# Patient Record
Sex: MALE | Race: BLACK OR AFRICAN AMERICAN | Employment: OTHER | URBAN - METROPOLITAN AREA
[De-identification: names, ages, dates, MRNs, and addresses within clinical notes are randomized per-mention and may not be internally consistent; named-entity substitution may affect disease eponyms.]

---

## 2019-07-29 ENCOUNTER — HOSPITAL ENCOUNTER (EMERGENCY)
Age: 49
Discharge: HOME | End: 2019-07-29
Payer: MEDICAID

## 2019-07-29 VITALS
WEIGHT: 181 LBS | TEMPERATURE: 98 F | HEART RATE: 73 BPM | OXYGEN SATURATION: 98 % | RESPIRATION RATE: 18 BRPM | DIASTOLIC BLOOD PRESSURE: 98 MMHG | BODY MASS INDEX: 25.91 KG/M2 | HEIGHT: 70 IN | SYSTOLIC BLOOD PRESSURE: 168 MMHG

## 2019-07-29 DIAGNOSIS — L30.9 ECZEMA, UNSPECIFIED TYPE: Primary | ICD-10-CM

## 2019-07-29 PROCEDURE — 6370000000 HC RX 637 (ALT 250 FOR IP): Performed by: PHYSICIAN ASSISTANT

## 2019-07-29 PROCEDURE — 99282 EMERGENCY DEPT VISIT SF MDM: CPT

## 2019-07-29 RX ORDER — PREDNISONE 20 MG/1
60 TABLET ORAL ONCE
Status: COMPLETED | OUTPATIENT
Start: 2019-07-29 | End: 2019-07-29

## 2019-07-29 RX ORDER — PREDNISONE 10 MG/1
TABLET ORAL
Qty: 61 TABLET | Refills: 0 | Status: SHIPPED | OUTPATIENT
Start: 2019-07-29 | End: 2019-08-20

## 2019-07-29 RX ADMIN — PREDNISONE 60 MG: 20 TABLET ORAL at 20:54

## 2019-08-02 NOTE — ED PROVIDER NOTES
on file     Attends meetings of clubs or organizations: Not on file     Relationship status: Not on file    Intimate partner violence:     Fear of current or ex partner: Not on file     Emotionally abused: Not on file     Physically abused: Not on file     Forced sexual activity: Not on file   Other Topics Concern    Not on file   Social History Narrative    Not on file       SCREENINGS             PHYSICAL EXAM    (up to 7 for level 4, 8 or more for level 5)     ED Triage Vitals   BP Temp Temp Source Pulse Resp SpO2 Height Weight   07/29/19 2027 07/29/19 2024 07/29/19 2024 07/29/19 2024 07/29/19 2024 07/29/19 2024 07/29/19 2024 07/29/19 2024   (!) 188/123 98 °F (36.7 °C) Oral 73 18 98 % 5' 10\" (1.778 m) 181 lb (82.1 kg)       Physical Exam   Constitutional: He is oriented to person, place, and time. He appears well-developed and well-nourished. He is active. No distress. HENT:   Head: Normocephalic and atraumatic. Mouth/Throat: Mucous membranes are normal.   Eyes: Conjunctivae and lids are normal.   Neck: Normal range of motion. Neck supple. Cardiovascular: Normal rate, regular rhythm, normal heart sounds, intact distal pulses and normal pulses. Pulmonary/Chest: Effort normal and breath sounds normal.   Abdominal: Soft. Normal appearance and bowel sounds are normal. There is no tenderness. Lymphadenopathy:     He has no cervical adenopathy. Neurological: He is alert and oriented to person, place, and time. He has normal strength. Skin: Skin is warm, dry and intact. Capillary refill takes less than 2 seconds. Rash noted. Rash is maculopapular. He is not diaphoretic. Skin colored raised rash, dry skin. Psychiatric: Judgment and thought content normal.   Nursing note and vitals reviewed. All other labs were within normal range or not returned as of this dictation.     EMERGENCY DEPARTMENT COURSE and DIFFERENTIALDIAGNOSIS/MDM:   Vitals:    Vitals:    07/29/19 2024 07/29/19 2027 07/29/19

## 2022-01-24 ENCOUNTER — HOSPITAL ENCOUNTER (EMERGENCY)
Age: 52
Discharge: HOME | End: 2022-01-24
Payer: MEDICAID

## 2022-01-24 ENCOUNTER — OFFICE VISIT (OUTPATIENT)
Dept: SURGERY | Age: 52
End: 2022-01-24
Payer: MEDICAID

## 2022-01-24 VITALS
HEART RATE: 69 BPM | DIASTOLIC BLOOD PRESSURE: 110 MMHG | OXYGEN SATURATION: 97 % | WEIGHT: 181 LBS | HEIGHT: 69 IN | TEMPERATURE: 97.7 F | SYSTOLIC BLOOD PRESSURE: 172 MMHG | BODY MASS INDEX: 26.81 KG/M2 | RESPIRATION RATE: 20 BRPM

## 2022-01-24 VITALS
WEIGHT: 162 LBS | TEMPERATURE: 98.4 F | BODY MASS INDEX: 23.99 KG/M2 | DIASTOLIC BLOOD PRESSURE: 120 MMHG | HEIGHT: 69 IN | SYSTOLIC BLOOD PRESSURE: 160 MMHG

## 2022-01-24 DIAGNOSIS — L02.91 ABSCESS: Primary | ICD-10-CM

## 2022-01-24 DIAGNOSIS — L02.01 FACIAL ABSCESS: ICD-10-CM

## 2022-01-24 DIAGNOSIS — L02.01 FACIAL ABSCESS: Primary | ICD-10-CM

## 2022-01-24 PROCEDURE — 99202 OFFICE O/P NEW SF 15 MIN: CPT | Performed by: SURGERY

## 2022-01-24 PROCEDURE — G8484 FLU IMMUNIZE NO ADMIN: HCPCS | Performed by: SURGERY

## 2022-01-24 PROCEDURE — 10061 I&D ABSCESS COMP/MULTIPLE: CPT | Performed by: SURGERY

## 2022-01-24 PROCEDURE — G8427 DOCREV CUR MEDS BY ELIG CLIN: HCPCS | Performed by: SURGERY

## 2022-01-24 PROCEDURE — G8420 CALC BMI NORM PARAMETERS: HCPCS | Performed by: SURGERY

## 2022-01-24 PROCEDURE — 99283 EMERGENCY DEPT VISIT LOW MDM: CPT

## 2022-01-24 PROCEDURE — 4004F PT TOBACCO SCREEN RCVD TLK: CPT | Performed by: SURGERY

## 2022-01-24 RX ORDER — SULFAMETHOXAZOLE AND TRIMETHOPRIM 800; 160 MG/1; MG/1
1 TABLET ORAL 2 TIMES DAILY
Qty: 20 TABLET | Refills: 0 | Status: SHIPPED | OUTPATIENT
Start: 2022-01-24 | End: 2022-02-03

## 2022-01-24 ASSESSMENT — ENCOUNTER SYMPTOMS
ABDOMINAL PAIN: 0
SHORTNESS OF BREATH: 0
NAUSEA: 0
BACK PAIN: 0
COUGH: 0

## 2022-01-24 ASSESSMENT — PAIN SCALES - GENERAL: PAINLEVEL_OUTOF10: 3

## 2022-01-24 NOTE — ED TRIAGE NOTES
Pt  Noticed swelling under his left eye about four days ago. He believes he  May have been bitten by  An insect but is not sure. Pt states that yesterday his niece bumped it and it opened up and  Started draining but  Today it is swollen again.

## 2022-01-24 NOTE — ED PROVIDER NOTES
[facility] ED  eMERGENCY dEPARTMENT eNCOUnter      Pt Name: Hardik Castaneda  MRN: [id number]  [place] 1970  Date of evaluation: 1/24/2022  Provider: LUIGI Khan CNP      HISTORY OF PRESENT ILLNESS    Hardik Castaneda is a 54 y.o. male who presents to the Emergency Department with L cheek abscess that he noticed about 4 days ago. Patient states it was bumped and started draining a few days ago. Pain is mild. Denies nausea or fever. REVIEW OF SYSTEMS       Review of Systems   Constitutional: Negative for fever. HENT: Negative for congestion. Respiratory: Negative for cough and shortness of breath. Cardiovascular: Negative for chest pain. Gastrointestinal: Negative for abdominal pain and nausea. Genitourinary: Negative for dysuria. Musculoskeletal: Negative for arthralgias and back pain. Skin: Positive for wound. Negative for rash. All other systems reviewed and are negative. PAST MEDICAL HISTORY     Past Medical History:   Diagnosis Date    Asthma     Hypertension     MI (myocardial infarction) ([facility] [address]          SURGICAL HISTORY       Past Surgical History:   Procedure Laterality Date    CORONARY ANGIOPLASTY WITH STENT PLACEMENT      MOUTH SURGERY           CURRENT MEDICATIONS       Previous Medications    No medications on file       ALLERGIES     Patient has no known allergies. FAMILY HISTORY     History reviewed. No pertinent family history.        SOCIAL HISTORY       Social History     Socioeconomic History    Marital status: Single     Spouse name: None    Number of children: None    Years of education: None    Highest education level: None   Occupational History    None   Tobacco Use    Smoking status: Current Every Day Smoker    Smokeless tobacco: Never Used   Substance and Sexual Activity    Alcohol use: Not Currently    Drug use: Never    Sexual activity: None   Other Topics Concern    None   Social History Narrative    None Social Determinants of Health     Financial Resource Strain:     Difficulty of Paying Living Expenses: Not on file   Food Insecurity:     Worried About Running Out of Food in the Last Year: Not on file    Nhan of Food in the Last Year: Not on file   Transportation Needs:     Lack of Transportation (Medical): Not on file    Lack of Transportation (Non-Medical): Not on file   Physical Activity:     Days of Exercise per Week: Not on file    Minutes of Exercise per Session: Not on file   Stress:     Feeling of Stress : Not on file   Social Connections:     Frequency of Communication with Friends and Family: Not on file    Frequency of Social Gatherings with Friends and Family: Not on file    Attends Mosque Services: Not on file    Active Member of [address] [place] [address] [employer] or Organizations: Not on file    Attends Club or Organization Meetings: Not on file    Marital Status: Not on file   Intimate Partner Violence:     Fear of Current or Ex-Partner: Not on file    Emotionally Abused: Not on file    Physically Abused: Not on file    Sexually Abused: Not on file   Housing Stability:     Unable to Pay for Housing in the Last Year: Not on file    Number of [place] in the Last Year: Not on file    Unstable Housing in the Last Year: Not on file       SCREENINGS      @FLOW([id number])@      PHYSICAL EXAM    (up to 7 for level 4, 8 or more for level 5)     ED Triage Vitals [01/24/22 0853]   BP Temp Temp Source Pulse Resp SpO2 Height Weight   (S) (!) 172/110 97.7 °F (36.5 °C) Oral 69 20 97 % 5' 9\" (1.753 m) 181 lb (82.1 kg)       Physical Exam  Vitals and nursing note reviewed. Constitutional:       Appearance: He is well-developed. HENT:      Head: Normocephalic and atraumatic. Right Ear: Hearing and external ear normal.      Left Ear: Hearing and external ear normal.      Nose: Nose normal.      Mouth/Throat:      Lips: Pink.       Mouth: Mucous membranes are moist.   Eyes:      Conjunctiva/sclera: Conjunctivae normal.      Pupils: Pupils are equal, round, and reactive to light. Cardiovascular:      Rate and Rhythm: Normal rate and regular rhythm. Pulmonary:      Effort: Pulmonary effort is normal.      Breath sounds: Normal breath sounds. Abdominal:      General: Bowel sounds are normal. There is no distension. Palpations: Abdomen is soft. Tenderness: There is no abdominal tenderness. Musculoskeletal:         General: Normal range of motion. Cervical back: Normal range of motion and neck supple. Skin:     General: Skin is warm and dry. Neurological:      Mental Status: He is alert and oriented to person, place, and time. Deep Tendon Reflexes: Reflexes are normal and symmetric. Psychiatric:         Judgment: Judgment normal.           All other labs were within normal range or not returned as of this dictation. EMERGENCY DEPARTMENT COURSE and DIFFERENTIALDIAGNOSIS/MDM:   Vitals:    Vitals:    01/24/22 0853   BP: (S) (!) 172/110   Pulse: 69   Resp: 20   Temp: 97.7 °F (36.5 °C)   TempSrc: Oral   SpO2: 97%   Weight: 181 lb (82.1 kg)   Height: 5' 9\" (1.753 m)            54 yr old male with L cheek abscess. Patient was given a prescription for Bactrim DS. F/U with the surgeon in 1-2 days. Patient verbalizes understanding. PROCEDURES:  Unless otherwise noted below, none     Procedures      FINAL IMPRESSION      1.  Abscess          DISPOSITION/PLAN   DISPOSITION Decision To Discharge 01/24/2022 09:31:03 AM          LUIGI Maldonado CNP (electronically signed)  Attending Emergency Physician     LUIGI Maldonado CNP  01/24/22 8301

## 2022-01-24 NOTE — PROGRESS NOTES
Eva Breen (:  1970) is a 54 y.o. male,New patient, here for evaluation of the following chief complaint(s):  Follow-Up from Hospital (HFU abscess under left eye)         ASSESSMENT/PLAN:  Facial abscess        Incision and drainage  Time out was called and the patient and procedure were identified. Options of therapy were discussed with the patient and Eva Breen agrees to an incision and drainage. The procedure as well as risks and complications including but not exclusive to blood loss and recurrent infection, even requiring further surgery were discussed and a consent was signed. The patient was in the supine position. The area was prepped and draped with betadine in a sterile fashion. The area was infiltrated with 2% lidocaine without epinepherine. A cruciate incision was made with a 15 knife blade and a moderate amount of  purulent material was drained. A culture was obtained. The wound was packed with 1/4 in.nuguaze   DSD was applied. Patient tolerated procedure well with minimal blood loss. Pain level pre procedure was 6 and post procedure it was 2 Instructions were given to remove the packing in 1 days. The wound will be cleansed daily with water and a dressing applied. Return visit in 3 days. .       Subjective   SUBJECTIVE/OBJECTIVE:  HPI  Eva Breen is a 54 y.o. male who is referred to me by [facility] ER for a facial abscess. Patient first noted symptoms 4 day(s)ago. He developed a tender lump. Patient also notes pain. He was seen at  the emergency department. Incision & drainage has not been done. Cultures has not been done. The patient is not on anticoagulants. Patient is on BACTRIM ds for the infection. Review of Systems       Objective   Physical Exam  Constitutional:       General: He is not in acute distress. Appearance: Normal appearance. HENT:      Head:        Mouth/Throat:      Mouth: Mucous membranes are moist.      Pharynx: Oropharynx is clear.    Eyes: Pupils: Pupils are equal, round, and reactive to light. Neck:      Comments: Neck is supple without any masses, no thyromegaly, trachea midline  Musculoskeletal:      Comments: Normal gait   Skin:     Findings: No bruising, lesion or rash. Neurological:      Mental Status: He is alert and oriented to person, place, and time. Psychiatric:         Mood and Affect: Mood normal.         Judgment: Judgment normal.         BP (!) 190/122   Temp 98.4 °F (36.9 °C)   Ht 5' 9\" (1.753 m)   Wt 162 lb (73.5 kg)   BMI 23.92 kg/m²           An electronic signature was used to authenticate this note.     --Francois Esparza MD

## 2022-01-26 LAB
ANAEROBIC CULTURE: ABNORMAL
GRAM STAIN RESULT: ABNORMAL
ORGANISM: ABNORMAL
WOUND/ABSCESS: ABNORMAL

## 2022-01-27 ENCOUNTER — OFFICE VISIT (OUTPATIENT)
Dept: SURGERY | Age: 52
End: 2022-01-27

## 2022-01-27 VITALS
SYSTOLIC BLOOD PRESSURE: 140 MMHG | HEIGHT: 69 IN | TEMPERATURE: 97.3 F | DIASTOLIC BLOOD PRESSURE: 118 MMHG | BODY MASS INDEX: 24.29 KG/M2 | WEIGHT: 164 LBS

## 2022-01-27 DIAGNOSIS — L02.01 FACIAL ABSCESS: Primary | ICD-10-CM

## 2022-01-27 PROCEDURE — 99024 POSTOP FOLLOW-UP VISIT: CPT | Performed by: SURGERY

## 2022-01-27 NOTE — PROGRESS NOTES
Abigail Roberts (:  1970) is a 54 y.o. male,Established patient, here for evaluation of the following chief complaint(s):  Follow Up After Procedure (recheck eye abscess)         ASSESSMENT/PLAN:    Doing well      The patient is instructed to return to see me in 2 weeks. New Bactrim    Subjective   SUBJECTIVE/OBJECTIVE:  HPI  Abigail Roberts is status post Incision and drainage of a facial abscess on 2022. Cultures showed MSSA. The packing has been removed. The wound is not draining. Pain is rated as a 1. He is back to normal activities. Review of Systems       Objective   Physical Exam  Constitutional:       General: He is not in acute distress. Appearance: Normal appearance. HENT:      Head:        Mouth/Throat:      Mouth: Mucous membranes are moist.      Pharynx: Oropharynx is clear. Eyes:      Pupils: Pupils are equal, round, and reactive to light. Neck:      Comments: Neck is supple without any masses, no thyromegaly, trachea midline  Musculoskeletal:      Comments: Normal gait   Skin:     Findings: No bruising, lesion or rash. Neurological:      Mental Status: He is alert and oriented to person, place, and time. Psychiatric:         Mood and Affect: Mood normal.         Judgment: Judgment normal.         BP (!) 140/118   Temp 97.3 °F (36.3 °C)   Ht 5' 9\" (1.753 m)   Wt 164 lb (74.4 kg)   BMI 24.22 kg/m²           An electronic signature was used to authenticate this note.     --Junior Garcia MD

## 2022-02-10 ENCOUNTER — OFFICE VISIT (OUTPATIENT)
Dept: SURGERY | Age: 52
End: 2022-02-10
Payer: MEDICAID

## 2022-02-10 VITALS
DIASTOLIC BLOOD PRESSURE: 86 MMHG | BODY MASS INDEX: 23.85 KG/M2 | TEMPERATURE: 97.7 F | SYSTOLIC BLOOD PRESSURE: 130 MMHG | HEIGHT: 69 IN | WEIGHT: 161 LBS

## 2022-02-10 DIAGNOSIS — L02.01 FACIAL ABSCESS: Primary | ICD-10-CM

## 2022-02-10 PROCEDURE — G8427 DOCREV CUR MEDS BY ELIG CLIN: HCPCS | Performed by: SURGERY

## 2022-02-10 PROCEDURE — G8420 CALC BMI NORM PARAMETERS: HCPCS | Performed by: SURGERY

## 2022-02-10 PROCEDURE — 4004F PT TOBACCO SCREEN RCVD TLK: CPT | Performed by: SURGERY

## 2022-02-10 PROCEDURE — G8484 FLU IMMUNIZE NO ADMIN: HCPCS | Performed by: SURGERY

## 2022-02-10 PROCEDURE — 99212 OFFICE O/P EST SF 10 MIN: CPT | Performed by: SURGERY

## 2022-02-10 NOTE — PROGRESS NOTES
Jg Best (:  1970) is a 54 y.o. male,Established patient, here for evaluation of the following chief complaint(s): Other (follow up recheck under left eye abscess)         ASSESSMENT/PLAN:    Doing well      The patient is instructed to return to see me in 4 weeks. Subjective   SUBJECTIVE/OBJECTIVE:  HPI  Jg Best is status post Incision and drainage of a facial abscess on 2022. Cultures showed MSSA. The packing has been removed. The wound is not draining. Pain is rated as a 1. He is back to normal activities. He is still on Bactrim. Review of Systems       Objective   Physical Exam  Constitutional:       General: He is not in acute distress. Appearance: Normal appearance. HENT:      Head:        Mouth/Throat:      Mouth: Mucous membranes are moist.      Pharynx: Oropharynx is clear. Eyes:      Pupils: Pupils are equal, round, and reactive to light. Neck:      Comments: Neck is supple without any masses, no thyromegaly, trachea midline  Musculoskeletal:      Right lower leg: No edema. Comments: Normal gait   Skin:     Findings: No bruising, lesion or rash. Neurological:      Mental Status: He is alert and oriented to person, place, and time. Psychiatric:         Mood and Affect: Mood normal.         Judgment: Judgment normal.         /86   Temp 97.7 °F (36.5 °C)   Ht 5' 9\" (1.753 m)   Wt 161 lb (73 kg)   BMI 23.78 kg/m²           An electronic signature was used to authenticate this note.     --Latasha Murdock MD

## 2022-03-10 ENCOUNTER — OFFICE VISIT (OUTPATIENT)
Dept: SURGERY | Age: 52
End: 2022-03-10
Payer: MEDICAID

## 2022-03-10 VITALS
HEIGHT: 69 IN | BODY MASS INDEX: 23.85 KG/M2 | WEIGHT: 161 LBS | DIASTOLIC BLOOD PRESSURE: 78 MMHG | SYSTOLIC BLOOD PRESSURE: 122 MMHG | TEMPERATURE: 96.9 F

## 2022-03-10 DIAGNOSIS — L02.01 FACIAL ABSCESS: Primary | ICD-10-CM

## 2022-03-10 PROCEDURE — 4004F PT TOBACCO SCREEN RCVD TLK: CPT | Performed by: SURGERY

## 2022-03-10 PROCEDURE — G8484 FLU IMMUNIZE NO ADMIN: HCPCS | Performed by: SURGERY

## 2022-03-10 PROCEDURE — G8427 DOCREV CUR MEDS BY ELIG CLIN: HCPCS | Performed by: SURGERY

## 2022-03-10 PROCEDURE — 99212 OFFICE O/P EST SF 10 MIN: CPT | Performed by: SURGERY

## 2022-03-10 PROCEDURE — G8420 CALC BMI NORM PARAMETERS: HCPCS | Performed by: SURGERY

## 2022-03-10 NOTE — PROGRESS NOTES
Jay Collins (:  1970) is a 54 y.o. male,Established patient, here for evaluation of the following chief complaint(s): Other (recheck abscess under eye)         ASSESSMENT/PLAN:    Doing well      The patient is instructed to return to see me as needed    Subjective   SUBJECTIVE/OBJECTIVE:  Other      Jay Collins is status post Incision and drainage of a facial abscess on 2022. Cultures showed MSSA. The packing has been removed. The wound is not draining. Pain is rated as a 0. He is back to normal activities. He is on no antibiotics. He is very pleased with his results  Review of Systems   14 systems reviewed and negative other than history of present illness    Objective   Physical Exam  Constitutional:       General: He is not in acute distress. Appearance: Normal appearance. HENT:      Head:        Mouth/Throat:      Mouth: Mucous membranes are moist.      Pharynx: Oropharynx is clear. Eyes:      Pupils: Pupils are equal, round, and reactive to light. Neck:      Comments: Neck is supple without any masses, no thyromegaly, trachea midline  Musculoskeletal:      Right lower leg: No edema. Comments: Normal gait   Skin:     Findings: No bruising, lesion or rash. Neurological:      Mental Status: He is alert and oriented to person, place, and time. Psychiatric:         Mood and Affect: Mood normal.         Judgment: Judgment normal.         /78   Temp 96.9 °F (36.1 °C)   Ht 5' 9\" (1.753 m)   Wt 161 lb (73 kg)   BMI 23.78 kg/m²           An electronic signature was used to authenticate this note.     --Maria M Reed MD

## 2022-04-19 ENCOUNTER — HOSPITAL ENCOUNTER (INPATIENT)
Age: 52
LOS: 2 days | Discharge: HOME | DRG: 174 | End: 2022-04-21
Attending: STUDENT IN AN ORGANIZED HEALTH CARE EDUCATION/TRAINING PROGRAM | Admitting: INTERNAL MEDICINE
Payer: MEDICAID

## 2022-04-19 ENCOUNTER — APPOINTMENT (OUTPATIENT)
Dept: GENERAL RADIOLOGY | Age: 52
DRG: 174 | End: 2022-04-19
Payer: MEDICAID

## 2022-04-19 DIAGNOSIS — I21.3 ST ELEVATION MYOCARDIAL INFARCTION (STEMI), UNSPECIFIED ARTERY (HCC): Primary | ICD-10-CM

## 2022-04-19 DIAGNOSIS — I25.10 CAD S/P PERCUTANEOUS CORONARY ANGIOPLASTY: ICD-10-CM

## 2022-04-19 DIAGNOSIS — Z98.61 CAD S/P PERCUTANEOUS CORONARY ANGIOPLASTY: ICD-10-CM

## 2022-04-19 LAB
ABO/RH: NORMAL
ALBUMIN SERPL-MCNC: 4.2 G/DL (ref 3.5–4.6)
ALP BLD-CCNC: 107 U/L (ref 35–104)
ALT SERPL-CCNC: 18 U/L (ref 0–41)
ANION GAP SERPL CALCULATED.3IONS-SCNC: 10 MEQ/L (ref 9–15)
ANTIBODY SCREEN: NORMAL
APTT: 29.4 SEC (ref 24.4–36.8)
AST SERPL-CCNC: 29 U/L (ref 0–40)
BASOPHILS ABSOLUTE: 0.1 K/UL (ref 0–0.2)
BASOPHILS RELATIVE PERCENT: 1 %
BILIRUB SERPL-MCNC: 0.5 MG/DL (ref 0.2–0.7)
BUN BLDV-MCNC: 9 MG/DL (ref 6–20)
CALCIUM SERPL-MCNC: 9.5 MG/DL (ref 8.5–9.9)
CHLORIDE BLD-SCNC: 101 MEQ/L (ref 95–107)
CHP ED QC CHECK: YES
CO2: 26 MEQ/L (ref 20–31)
CREAT SERPL-MCNC: 1.02 MG/DL (ref 0.7–1.2)
EOSINOPHILS ABSOLUTE: 0.4 K/UL (ref 0–0.7)
EOSINOPHILS RELATIVE PERCENT: 5.3 %
ETHANOL PERCENT: NORMAL G/DL
ETHANOL: <10 MG/DL (ref 0–0.08)
GFR AFRICAN AMERICAN: >60
GFR NON-AFRICAN AMERICAN: >60
GLOBULIN: 3.1 G/DL (ref 2.3–3.5)
GLUCOSE BLD-MCNC: 120 MG/DL
GLUCOSE BLD-MCNC: 120 MG/DL (ref 70–99)
GLUCOSE BLD-MCNC: 132 MG/DL (ref 70–99)
HCT VFR BLD CALC: 41.1 % (ref 42–52)
HEMOGLOBIN: 13.8 G/DL (ref 14–18)
INR BLD: 1
LACTIC ACID: 0.9 MMOL/L (ref 0.5–2.2)
LV EF: 40 %
LVEF MODALITY: NORMAL
LYMPHOCYTES ABSOLUTE: 1.8 K/UL (ref 1–4.8)
LYMPHOCYTES RELATIVE PERCENT: 24.7 %
MAGNESIUM: 2 MG/DL (ref 1.7–2.4)
MCH RBC QN AUTO: 31.4 PG (ref 27–31.3)
MCHC RBC AUTO-ENTMCNC: 33.7 % (ref 33–37)
MCV RBC AUTO: 93.2 FL (ref 80–100)
MONOCYTES ABSOLUTE: 0.8 K/UL (ref 0.2–0.8)
MONOCYTES RELATIVE PERCENT: 10.7 %
NEUTROPHILS ABSOLUTE: 4.4 K/UL (ref 1.4–6.5)
NEUTROPHILS RELATIVE PERCENT: 58.3 %
PDW BLD-RTO: 14.8 % (ref 11.5–14.5)
PERFORMED ON: ABNORMAL
PERFORMED ON: ABNORMAL
PLATELET # BLD: 219 K/UL (ref 130–400)
POC ACTIVATED CLOTTING TIME KAOLIN: 368 SEC (ref 82–152)
POC SAMPLE TYPE: ABNORMAL
POTASSIUM SERPL-SCNC: 3.4 MEQ/L (ref 3.4–4.9)
PRO-BNP: 3503 PG/ML
PROTHROMBIN TIME: 13.1 SEC (ref 12.3–14.9)
RBC # BLD: 4.41 M/UL (ref 4.7–6.1)
SODIUM BLD-SCNC: 137 MEQ/L (ref 135–144)
TOTAL CK: 291 U/L (ref 0–190)
TOTAL PROTEIN: 7.3 G/DL (ref 6.3–8)
TROPONIN: 0.43 NG/ML (ref 0–0.01)
TSH REFLEX: 0.91 UIU/ML (ref 0.44–3.86)
WBC # BLD: 7.5 K/UL (ref 4.8–10.8)

## 2022-04-19 PROCEDURE — 96375 TX/PRO/DX INJ NEW DRUG ADDON: CPT

## 2022-04-19 PROCEDURE — 2709999900 HC NON-CHARGEABLE SUPPLY

## 2022-04-19 PROCEDURE — 86850 RBC ANTIBODY SCREEN: CPT

## 2022-04-19 PROCEDURE — 6370000000 HC RX 637 (ALT 250 FOR IP): Performed by: STUDENT IN AN ORGANIZED HEALTH CARE EDUCATION/TRAINING PROGRAM

## 2022-04-19 PROCEDURE — 85347 COAGULATION TIME ACTIVATED: CPT

## 2022-04-19 PROCEDURE — C1894 INTRO/SHEATH, NON-LASER: HCPCS

## 2022-04-19 PROCEDURE — 36415 COLL VENOUS BLD VENIPUNCTURE: CPT

## 2022-04-19 PROCEDURE — 2580000003 HC RX 258: Performed by: STUDENT IN AN ORGANIZED HEALTH CARE EDUCATION/TRAINING PROGRAM

## 2022-04-19 PROCEDURE — 99285 EMERGENCY DEPT VISIT HI MDM: CPT

## 2022-04-19 PROCEDURE — 6360000004 HC RX CONTRAST MEDICATION: Performed by: INTERNAL MEDICINE

## 2022-04-19 PROCEDURE — B2151ZZ FLUOROSCOPY OF LEFT HEART USING LOW OSMOLAR CONTRAST: ICD-10-PCS | Performed by: INTERNAL MEDICINE

## 2022-04-19 PROCEDURE — 2500000003 HC RX 250 WO HCPCS

## 2022-04-19 PROCEDURE — 84443 ASSAY THYROID STIM HORMONE: CPT

## 2022-04-19 PROCEDURE — 86900 BLOOD TYPING SEROLOGIC ABO: CPT

## 2022-04-19 PROCEDURE — 85730 THROMBOPLASTIN TIME PARTIAL: CPT

## 2022-04-19 PROCEDURE — C1887 CATHETER, GUIDING: HCPCS

## 2022-04-19 PROCEDURE — 93005 ELECTROCARDIOGRAM TRACING: CPT | Performed by: EMERGENCY MEDICINE

## 2022-04-19 PROCEDURE — 99223 1ST HOSP IP/OBS HIGH 75: CPT | Performed by: INTERNAL MEDICINE

## 2022-04-19 PROCEDURE — 4A023N7 MEASUREMENT OF CARDIAC SAMPLING AND PRESSURE, LEFT HEART, PERCUTANEOUS APPROACH: ICD-10-PCS | Performed by: INTERNAL MEDICINE

## 2022-04-19 PROCEDURE — C1769 GUIDE WIRE: HCPCS

## 2022-04-19 PROCEDURE — 82077 ASSAY SPEC XCP UR&BREATH IA: CPT

## 2022-04-19 PROCEDURE — 027035Z DILATION OF CORONARY ARTERY, ONE ARTERY WITH TWO DRUG-ELUTING INTRALUMINAL DEVICES, PERCUTANEOUS APPROACH: ICD-10-PCS | Performed by: INTERNAL MEDICINE

## 2022-04-19 PROCEDURE — C1874 STENT, COATED/COV W/DEL SYS: HCPCS

## 2022-04-19 PROCEDURE — 93458 L HRT ARTERY/VENTRICLE ANGIO: CPT

## 2022-04-19 PROCEDURE — C1725 CATH, TRANSLUMIN NON-LASER: HCPCS

## 2022-04-19 PROCEDURE — 83735 ASSAY OF MAGNESIUM: CPT

## 2022-04-19 PROCEDURE — 82948 REAGENT STRIP/BLOOD GLUCOSE: CPT

## 2022-04-19 PROCEDURE — 71045 X-RAY EXAM CHEST 1 VIEW: CPT

## 2022-04-19 PROCEDURE — 85610 PROTHROMBIN TIME: CPT

## 2022-04-19 PROCEDURE — 6360000002 HC RX W HCPCS: Performed by: STUDENT IN AN ORGANIZED HEALTH CARE EDUCATION/TRAINING PROGRAM

## 2022-04-19 PROCEDURE — 86901 BLOOD TYPING SEROLOGIC RH(D): CPT

## 2022-04-19 PROCEDURE — 83605 ASSAY OF LACTIC ACID: CPT

## 2022-04-19 PROCEDURE — 6360000002 HC RX W HCPCS

## 2022-04-19 PROCEDURE — 83880 ASSAY OF NATRIURETIC PEPTIDE: CPT

## 2022-04-19 PROCEDURE — 82550 ASSAY OF CK (CPK): CPT

## 2022-04-19 PROCEDURE — 85025 COMPLETE CBC W/AUTO DIFF WBC: CPT

## 2022-04-19 PROCEDURE — B2111ZZ FLUOROSCOPY OF MULTIPLE CORONARY ARTERIES USING LOW OSMOLAR CONTRAST: ICD-10-PCS | Performed by: INTERNAL MEDICINE

## 2022-04-19 PROCEDURE — 2000000000 HC ICU R&B

## 2022-04-19 PROCEDURE — 92941 PRQ TRLML REVSC TOT OCCL AMI: CPT

## 2022-04-19 PROCEDURE — 96374 THER/PROPH/DIAG INJ IV PUSH: CPT

## 2022-04-19 PROCEDURE — 84484 ASSAY OF TROPONIN QUANT: CPT

## 2022-04-19 PROCEDURE — 80053 COMPREHEN METABOLIC PANEL: CPT

## 2022-04-19 RX ORDER — SODIUM CHLORIDE 9 MG/ML
INJECTION, SOLUTION INTRAVENOUS CONTINUOUS PRN
Status: COMPLETED | OUTPATIENT
Start: 2022-04-19 | End: 2022-04-19

## 2022-04-19 RX ORDER — ASPIRIN 81 MG/1
81 TABLET, CHEWABLE ORAL DAILY
Status: DISCONTINUED | OUTPATIENT
Start: 2022-04-20 | End: 2022-04-22 | Stop reason: HOSPADM

## 2022-04-19 RX ORDER — LOSARTAN POTASSIUM 25 MG/1
25 TABLET ORAL DAILY
Status: DISCONTINUED | OUTPATIENT
Start: 2022-04-20 | End: 2022-04-21

## 2022-04-19 RX ORDER — ASPIRIN 81 MG/1
TABLET, CHEWABLE ORAL
Status: COMPLETED | OUTPATIENT
Start: 2022-04-19 | End: 2022-04-19

## 2022-04-19 RX ORDER — ONDANSETRON 2 MG/ML
4 INJECTION INTRAMUSCULAR; INTRAVENOUS ONCE
Status: COMPLETED | OUTPATIENT
Start: 2022-04-19 | End: 2022-04-19

## 2022-04-19 RX ORDER — ASPIRIN 325 MG
325 TABLET ORAL ONCE
Status: DISCONTINUED | OUTPATIENT
Start: 2022-04-19 | End: 2022-04-22 | Stop reason: HOSPADM

## 2022-04-19 RX ORDER — ACETAMINOPHEN 650 MG/1
650 SUPPOSITORY RECTAL EVERY 6 HOURS PRN
Status: DISCONTINUED | OUTPATIENT
Start: 2022-04-19 | End: 2022-04-22 | Stop reason: HOSPADM

## 2022-04-19 RX ORDER — ONDANSETRON 4 MG/1
4 TABLET, ORALLY DISINTEGRATING ORAL EVERY 8 HOURS PRN
Status: DISCONTINUED | OUTPATIENT
Start: 2022-04-19 | End: 2022-04-22 | Stop reason: HOSPADM

## 2022-04-19 RX ORDER — HEPARIN SODIUM 1000 [USP'U]/ML
4000 INJECTION, SOLUTION INTRAVENOUS; SUBCUTANEOUS ONCE
Status: DISCONTINUED | OUTPATIENT
Start: 2022-04-19 | End: 2022-04-19

## 2022-04-19 RX ORDER — HEPARIN SODIUM 1000 [USP'U]/ML
INJECTION, SOLUTION INTRAVENOUS; SUBCUTANEOUS
Status: COMPLETED | OUTPATIENT
Start: 2022-04-19 | End: 2022-04-19

## 2022-04-19 RX ORDER — CARVEDILOL 3.12 MG/1
3.12 TABLET ORAL 2 TIMES DAILY WITH MEALS
Status: DISCONTINUED | OUTPATIENT
Start: 2022-04-20 | End: 2022-04-20

## 2022-04-19 RX ORDER — ATORVASTATIN CALCIUM 40 MG/1
80 TABLET, FILM COATED ORAL NIGHTLY
Status: DISCONTINUED | OUTPATIENT
Start: 2022-04-19 | End: 2022-04-22 | Stop reason: HOSPADM

## 2022-04-19 RX ORDER — HEPARIN SODIUM 1000 [USP'U]/ML
2000 INJECTION, SOLUTION INTRAVENOUS; SUBCUTANEOUS PRN
Status: DISCONTINUED | OUTPATIENT
Start: 2022-04-19 | End: 2022-04-19

## 2022-04-19 RX ORDER — SODIUM CHLORIDE 9 MG/ML
INJECTION, SOLUTION INTRAVENOUS PRN
Status: DISCONTINUED | OUTPATIENT
Start: 2022-04-19 | End: 2022-04-22 | Stop reason: HOSPADM

## 2022-04-19 RX ORDER — SODIUM CHLORIDE 0.9 % (FLUSH) 0.9 %
5-40 SYRINGE (ML) INJECTION PRN
Status: DISCONTINUED | OUTPATIENT
Start: 2022-04-19 | End: 2022-04-22 | Stop reason: HOSPADM

## 2022-04-19 RX ORDER — NITROGLYCERIN 20 MG/100ML
5-200 INJECTION INTRAVENOUS CONTINUOUS
Status: DISCONTINUED | OUTPATIENT
Start: 2022-04-19 | End: 2022-04-20

## 2022-04-19 RX ORDER — POLYETHYLENE GLYCOL 3350 17 G/17G
17 POWDER, FOR SOLUTION ORAL DAILY PRN
Status: DISCONTINUED | OUTPATIENT
Start: 2022-04-19 | End: 2022-04-22 | Stop reason: HOSPADM

## 2022-04-19 RX ORDER — ONDANSETRON 2 MG/ML
4 INJECTION INTRAMUSCULAR; INTRAVENOUS EVERY 6 HOURS PRN
Status: DISCONTINUED | OUTPATIENT
Start: 2022-04-19 | End: 2022-04-20

## 2022-04-19 RX ORDER — SODIUM CHLORIDE 0.9 % (FLUSH) 0.9 %
5-40 SYRINGE (ML) INJECTION EVERY 12 HOURS SCHEDULED
Status: DISCONTINUED | OUTPATIENT
Start: 2022-04-19 | End: 2022-04-22 | Stop reason: HOSPADM

## 2022-04-19 RX ORDER — ACETAMINOPHEN 325 MG/1
650 TABLET ORAL EVERY 6 HOURS PRN
Status: DISCONTINUED | OUTPATIENT
Start: 2022-04-19 | End: 2022-04-20

## 2022-04-19 RX ORDER — PANTOPRAZOLE SODIUM 40 MG/1
40 TABLET, DELAYED RELEASE ORAL
Status: DISCONTINUED | OUTPATIENT
Start: 2022-04-20 | End: 2022-04-22 | Stop reason: HOSPADM

## 2022-04-19 RX ORDER — MORPHINE SULFATE 4 MG/ML
4 INJECTION, SOLUTION INTRAMUSCULAR; INTRAVENOUS ONCE
Status: COMPLETED | OUTPATIENT
Start: 2022-04-19 | End: 2022-04-19

## 2022-04-19 RX ORDER — HEPARIN SODIUM 1000 [USP'U]/ML
4000 INJECTION, SOLUTION INTRAVENOUS; SUBCUTANEOUS PRN
Status: DISCONTINUED | OUTPATIENT
Start: 2022-04-19 | End: 2022-04-19

## 2022-04-19 RX ADMIN — ASPIRIN 81 MG CHEWABLE TABLET 324 MG: 81 TABLET CHEWABLE at 21:47

## 2022-04-19 RX ADMIN — HEPARIN SODIUM 4000 UNITS: 1000 INJECTION INTRAVENOUS; SUBCUTANEOUS at 21:46

## 2022-04-19 RX ADMIN — SODIUM CHLORIDE 1000 ML/HR: 9 INJECTION, SOLUTION INTRAVENOUS at 21:47

## 2022-04-19 RX ADMIN — TICAGRELOR 180 MG: 90 TABLET ORAL at 21:48

## 2022-04-19 RX ADMIN — MORPHINE SULFATE 4 MG: 4 INJECTION, SOLUTION INTRAMUSCULAR; INTRAVENOUS at 22:01

## 2022-04-19 RX ADMIN — IOPAMIDOL 174 ML: 612 INJECTION, SOLUTION INTRAVENOUS at 23:15

## 2022-04-19 RX ADMIN — ONDANSETRON 4 MG: 2 INJECTION INTRAMUSCULAR; INTRAVENOUS at 22:01

## 2022-04-19 ASSESSMENT — PAIN SCALES - GENERAL
PAINLEVEL_OUTOF10: 6

## 2022-04-19 ASSESSMENT — ENCOUNTER SYMPTOMS
RHINORRHEA: 0
CHEST TIGHTNESS: 1
VOMITING: 0
APNEA: 0
WHEEZING: 0
COLOR CHANGE: 0
COUGH: 0
ABDOMINAL PAIN: 0
DIARRHEA: 0
SHORTNESS OF BREATH: 0
NAUSEA: 0
EYE REDNESS: 0
CONSTIPATION: 0

## 2022-04-19 ASSESSMENT — PAIN DESCRIPTION - PAIN TYPE
TYPE: ACUTE PAIN

## 2022-04-19 ASSESSMENT — PAIN DESCRIPTION - LOCATION
LOCATION: CHEST

## 2022-04-19 ASSESSMENT — PAIN DESCRIPTION - ONSET: ONSET: ON-GOING

## 2022-04-19 ASSESSMENT — PAIN - FUNCTIONAL ASSESSMENT
PAIN_FUNCTIONAL_ASSESSMENT: 0-10
PAIN_FUNCTIONAL_ASSESSMENT: 0-10

## 2022-04-19 ASSESSMENT — PAIN DESCRIPTION - FREQUENCY: FREQUENCY: INTERMITTENT

## 2022-04-19 ASSESSMENT — PAIN DESCRIPTION - ORIENTATION: ORIENTATION: LEFT;RIGHT

## 2022-04-19 ASSESSMENT — PAIN DESCRIPTION - PROGRESSION: CLINICAL_PROGRESSION: NOT CHANGED

## 2022-04-19 ASSESSMENT — PAIN DESCRIPTION - DESCRIPTORS: DESCRIPTORS: ACHING

## 2022-04-20 LAB
ANION GAP SERPL CALCULATED.3IONS-SCNC: 18 MEQ/L (ref 9–15)
ANTI-XA UNFRAC HEPARIN: 0.28 IU/ML
ANTI-XA UNFRAC HEPARIN: <0.1 IU/ML
BUN BLDV-MCNC: 9 MG/DL (ref 6–20)
CALCIUM SERPL-MCNC: 9 MG/DL (ref 8.5–9.9)
CHLORIDE BLD-SCNC: 96 MEQ/L (ref 95–107)
CO2: 25 MEQ/L (ref 20–31)
CREAT SERPL-MCNC: 1.09 MG/DL (ref 0.7–1.2)
EKG ATRIAL RATE: 52 BPM
EKG ATRIAL RATE: 60 BPM
EKG ATRIAL RATE: 67 BPM
EKG P AXIS: 16 DEGREES
EKG P AXIS: 36 DEGREES
EKG P AXIS: 37 DEGREES
EKG P-R INTERVAL: 166 MS
EKG P-R INTERVAL: 188 MS
EKG Q-T INTERVAL: 478 MS
EKG Q-T INTERVAL: 510 MS
EKG Q-T INTERVAL: 514 MS
EKG QRS DURATION: 100 MS
EKG QRS DURATION: 104 MS
EKG QRS DURATION: 150 MS
EKG QTC CALCULATION (BAZETT): 478 MS
EKG QTC CALCULATION (BAZETT): 478 MS
EKG QTC CALCULATION (BAZETT): 554 MS
EKG R AXIS: -22 DEGREES
EKG R AXIS: 1 DEGREES
EKG R AXIS: 79 DEGREES
EKG T AXIS: 131 DEGREES
EKG T AXIS: 149 DEGREES
EKG T AXIS: 154 DEGREES
EKG VENTRICULAR RATE: 52 BPM
EKG VENTRICULAR RATE: 60 BPM
EKG VENTRICULAR RATE: 71 BPM
ETHANOL PERCENT: NORMAL G/DL
ETHANOL: <10 MG/DL (ref 0–0.08)
GFR AFRICAN AMERICAN: >60
GFR NON-AFRICAN AMERICAN: >60
GLUCOSE BLD-MCNC: 86 MG/DL (ref 70–99)
HBA1C MFR BLD: 5 % (ref 4.8–5.9)
HCT VFR BLD CALC: 44 % (ref 42–52)
HEMOGLOBIN: 14.7 G/DL (ref 14–18)
LV EF: 20 %
LVEF MODALITY: NORMAL
MAGNESIUM: 2 MG/DL (ref 1.7–2.4)
MCH RBC QN AUTO: 31.5 PG (ref 27–31.3)
MCHC RBC AUTO-ENTMCNC: 33.5 % (ref 33–37)
MCV RBC AUTO: 94 FL (ref 80–100)
PDW BLD-RTO: 14.7 % (ref 11.5–14.5)
PLATELET # BLD: 231 K/UL (ref 130–400)
POTASSIUM REFLEX MAGNESIUM: 3.3 MEQ/L (ref 3.4–4.9)
RBC # BLD: 4.68 M/UL (ref 4.7–6.1)
SODIUM BLD-SCNC: 139 MEQ/L (ref 135–144)
WBC # BLD: 6.5 K/UL (ref 4.8–10.8)

## 2022-04-20 PROCEDURE — 2060000000 HC ICU INTERMEDIATE R&B

## 2022-04-20 PROCEDURE — 93010 ELECTROCARDIOGRAM REPORT: CPT | Performed by: INTERNAL MEDICINE

## 2022-04-20 PROCEDURE — 82077 ASSAY SPEC XCP UR&BREATH IA: CPT

## 2022-04-20 PROCEDURE — 85027 COMPLETE CBC AUTOMATED: CPT

## 2022-04-20 PROCEDURE — 6370000000 HC RX 637 (ALT 250 FOR IP): Performed by: INTERNAL MEDICINE

## 2022-04-20 PROCEDURE — 2580000003 HC RX 258: Performed by: INTERNAL MEDICINE

## 2022-04-20 PROCEDURE — 93005 ELECTROCARDIOGRAM TRACING: CPT | Performed by: INTERNAL MEDICINE

## 2022-04-20 PROCEDURE — 85520 HEPARIN ASSAY: CPT

## 2022-04-20 PROCEDURE — 80048 BASIC METABOLIC PNL TOTAL CA: CPT

## 2022-04-20 PROCEDURE — 93306 TTE W/DOPPLER COMPLETE: CPT

## 2022-04-20 PROCEDURE — 83036 HEMOGLOBIN GLYCOSYLATED A1C: CPT

## 2022-04-20 PROCEDURE — 36415 COLL VENOUS BLD VENIPUNCTURE: CPT

## 2022-04-20 PROCEDURE — 99233 SBSQ HOSP IP/OBS HIGH 50: CPT | Performed by: INTERNAL MEDICINE

## 2022-04-20 PROCEDURE — 6360000002 HC RX W HCPCS: Performed by: INTERNAL MEDICINE

## 2022-04-20 PROCEDURE — 83735 ASSAY OF MAGNESIUM: CPT

## 2022-04-20 RX ORDER — ENOXAPARIN SODIUM 100 MG/ML
40 INJECTION SUBCUTANEOUS DAILY
Status: DISCONTINUED | OUTPATIENT
Start: 2022-04-21 | End: 2022-04-22 | Stop reason: HOSPADM

## 2022-04-20 RX ORDER — HYDRALAZINE HYDROCHLORIDE 20 MG/ML
10 INJECTION INTRAMUSCULAR; INTRAVENOUS EVERY 10 MIN PRN
Status: DISCONTINUED | OUTPATIENT
Start: 2022-04-20 | End: 2022-04-22 | Stop reason: HOSPADM

## 2022-04-20 RX ORDER — CARVEDILOL 12.5 MG/1
12.5 TABLET ORAL 2 TIMES DAILY WITH MEALS
Status: DISCONTINUED | OUTPATIENT
Start: 2022-04-20 | End: 2022-04-22 | Stop reason: HOSPADM

## 2022-04-20 RX ORDER — POTASSIUM CHLORIDE 20 MEQ/1
40 TABLET, EXTENDED RELEASE ORAL ONCE
Status: COMPLETED | OUTPATIENT
Start: 2022-04-20 | End: 2022-04-20

## 2022-04-20 RX ORDER — LABETALOL HYDROCHLORIDE 5 MG/ML
10 INJECTION, SOLUTION INTRAVENOUS EVERY 30 MIN PRN
Status: DISCONTINUED | OUTPATIENT
Start: 2022-04-20 | End: 2022-04-22 | Stop reason: HOSPADM

## 2022-04-20 RX ORDER — FENTANYL CITRATE 50 UG/ML
25 INJECTION, SOLUTION INTRAMUSCULAR; INTRAVENOUS
Status: ACTIVE | OUTPATIENT
Start: 2022-04-20 | End: 2022-04-20

## 2022-04-20 RX ORDER — HYDRALAZINE HYDROCHLORIDE 20 MG/ML
10 INJECTION INTRAMUSCULAR; INTRAVENOUS EVERY 4 HOURS PRN
Status: DISCONTINUED | OUTPATIENT
Start: 2022-04-20 | End: 2022-04-22 | Stop reason: HOSPADM

## 2022-04-20 RX ORDER — FUROSEMIDE 20 MG/1
20 TABLET ORAL DAILY
Status: DISCONTINUED | OUTPATIENT
Start: 2022-04-20 | End: 2022-04-22 | Stop reason: HOSPADM

## 2022-04-20 RX ORDER — ACETAMINOPHEN 325 MG/1
650 TABLET ORAL EVERY 4 HOURS PRN
Status: DISCONTINUED | OUTPATIENT
Start: 2022-04-20 | End: 2022-04-22 | Stop reason: HOSPADM

## 2022-04-20 RX ORDER — MIDAZOLAM HYDROCHLORIDE 2 MG/2ML
2 INJECTION, SOLUTION INTRAMUSCULAR; INTRAVENOUS
Status: ACTIVE | OUTPATIENT
Start: 2022-04-20 | End: 2022-04-20

## 2022-04-20 RX ORDER — ONDANSETRON 2 MG/ML
4 INJECTION INTRAMUSCULAR; INTRAVENOUS EVERY 6 HOURS PRN
Status: DISCONTINUED | OUTPATIENT
Start: 2022-04-20 | End: 2022-04-22 | Stop reason: HOSPADM

## 2022-04-20 RX ORDER — SPIRONOLACTONE 25 MG/1
25 TABLET ORAL DAILY
Status: DISCONTINUED | OUTPATIENT
Start: 2022-04-20 | End: 2022-04-22 | Stop reason: HOSPADM

## 2022-04-20 RX ORDER — MORPHINE SULFATE 2 MG/ML
2 INJECTION, SOLUTION INTRAMUSCULAR; INTRAVENOUS
Status: ACTIVE | OUTPATIENT
Start: 2022-04-20 | End: 2022-04-20

## 2022-04-20 RX ADMIN — Medication 10 ML: at 00:39

## 2022-04-20 RX ADMIN — HYDRALAZINE HYDROCHLORIDE 10 MG: 20 INJECTION, SOLUTION INTRAMUSCULAR; INTRAVENOUS at 03:48

## 2022-04-20 RX ADMIN — TICAGRELOR 90 MG: 90 TABLET ORAL at 08:07

## 2022-04-20 RX ADMIN — LOSARTAN POTASSIUM 25 MG: 25 TABLET, FILM COATED ORAL at 08:07

## 2022-04-20 RX ADMIN — CARVEDILOL 12.5 MG: 12.5 TABLET, FILM COATED ORAL at 16:30

## 2022-04-20 RX ADMIN — SPIRONOLACTONE 25 MG: 25 TABLET ORAL at 15:14

## 2022-04-20 RX ADMIN — POTASSIUM CHLORIDE 40 MEQ: 1500 TABLET, EXTENDED RELEASE ORAL at 10:49

## 2022-04-20 RX ADMIN — ATORVASTATIN CALCIUM 80 MG: 40 TABLET, FILM COATED ORAL at 00:44

## 2022-04-20 RX ADMIN — PANTOPRAZOLE SODIUM 40 MG: 40 TABLET, DELAYED RELEASE ORAL at 06:14

## 2022-04-20 RX ADMIN — FUROSEMIDE 20 MG: 20 TABLET ORAL at 15:14

## 2022-04-20 RX ADMIN — CARVEDILOL 3.12 MG: 3.12 TABLET, FILM COATED ORAL at 08:07

## 2022-04-20 RX ADMIN — TICAGRELOR 90 MG: 90 TABLET ORAL at 20:48

## 2022-04-20 RX ADMIN — Medication 10 ML: at 08:08

## 2022-04-20 RX ADMIN — ENOXAPARIN SODIUM 40 MG: 40 INJECTION SUBCUTANEOUS at 08:07

## 2022-04-20 RX ADMIN — Medication 5 ML: at 20:49

## 2022-04-20 RX ADMIN — ATORVASTATIN CALCIUM 80 MG: 40 TABLET, FILM COATED ORAL at 20:48

## 2022-04-20 RX ADMIN — ASPIRIN 81 MG 81 MG: 81 TABLET ORAL at 08:07

## 2022-04-20 ASSESSMENT — ENCOUNTER SYMPTOMS
SORE THROAT: 0
EYE PAIN: 0
EYE REDNESS: 0
APNEA: 0
DIARRHEA: 0
COUGH: 0
SHORTNESS OF BREATH: 0
NAUSEA: 1
BACK PAIN: 0
CHEST TIGHTNESS: 1
CONSTIPATION: 0
NAUSEA: 0
ABDOMINAL PAIN: 0
COLOR CHANGE: 0
WHEEZING: 0
SHORTNESS OF BREATH: 1
VOMITING: 0
RHINORRHEA: 0

## 2022-04-20 ASSESSMENT — PAIN SCALES - GENERAL
PAINLEVEL_OUTOF10: 0
PAINLEVEL_OUTOF10: 0

## 2022-04-20 NOTE — CARE COORDINATION
[facility] Initial Discharge Assessment    Met with Patient to discuss discharge plan. PCP: Does not have one                                Date of Last Visit: N/A    [facility] Patient: No        [facility] Notified: no    If no PCP, list provided? Yes    Discharge Planning    Living Arrangements: independently at home    Who do you live with? Patient said he lives with a friend    Who helps you with your care:  self    If lives at home:     Do you have any barriers navigating in your home? no    Patient can perform ADL? Yes    Current Services (outpatient and in home) :  None    Dialysis: No    Is transportation available to get to your appointments? Yes-Patient said his friend drives him to appointment    DME Equipment:  no    Respiratory equipment: None    Respiratory provider:  no     Pharmacy:  yes - [facility] in [address] [place] [address] with Medication Assistance Program?  No      Patient agreeable to [facility]? Declined    Patient agreeable to SNF/Rehab? Declined    Other discharge needs identified? Cardiac Rehab    Does Patient Have a High-Risk for Readmission Diagnosis (CHF, PN, MI, COPD)? No    Initial Discharge Plan? (Note: please see concurrent daily documentation for any updates after initial note). Patient denied need for [facility] or SNF. He said he is agreeable to cardiac rehab.      Readmission Risk              Risk of Unplanned Readmission:  10         Electronically signed by Shane Patel LCSW on 4/20/2022 at 4:32 PM

## 2022-04-20 NOTE — ED PROVIDER NOTES
[address] [facility]  eMERGENCY dEPARTMENT eNCOUnter      Pt Name: Kate Castro  MRN: [id number]  [place] 1970  Date of evaluation: 4/19/2022  Provider: Nuha Polanco MD      HISTORY OF PRESENT ILLNESS      Chief Complaint   Patient presents with    Chest Pain     intermittent since last night, \"smoked a cigarette before\"       The history is provided by the Patient. Kate Castro is a 50 y.o. male with a PMH clinically significant for Asthma, Tobacco abuse, HTN and CAD s/p PCI with stent x1 presenting to the ED via PV c/o chest pain, shortness of breath, diaphoresis, fatigue, nausea, lightheadedness and dizziness that has been intermittent over the past several days, becoming constant last night. States chest pain is aching, crushing and sternal.  No radiation of the pain. Not worse with deep inspiration or cough. States symptoms are similar to previous myocardial infarction. Has not been taking any of his medications for the past several months after [place]. States he was supposed to be on aspirin and Plavix. Also other medications which he is not taking. States symptoms were worse with any type of exertion. Denies any recent illegal substance abuse. Per Chart Review: No previous notes or records currently available. REVIEW OF SYSTEMS       Review of Systems   Constitutional: Positive for diaphoresis and fatigue. Negative for chills and fever. HENT: Negative for rhinorrhea and sore throat. Eyes: Negative for pain and visual disturbance. Respiratory: Positive for chest tightness and shortness of breath. Negative for cough. Cardiovascular: Positive for chest pain. Negative for palpitations and leg swelling. Gastrointestinal: Positive for nausea. Negative for abdominal pain, diarrhea and vomiting. Genitourinary: Negative for dysuria. Musculoskeletal: Negative for back pain and neck pain. Skin: Negative for rash.    Neurological: Positive for dizziness and light-headedness. Negative for weakness, numbness and headaches. PAST MEDICAL HISTORY     Past Medical History:   Diagnosis Date    Asthma     Hypertension     MI (myocardial infarction) ([address]        SURGICAL HISTORY       Past Surgical History:   Procedure Laterality Date    CORONARY ANGIOPLASTY WITH STENT PLACEMENT      MOUTH SURGERY         FAMILY HISTORY     No family history on file. SOCIAL HISTORY       Social History     Socioeconomic History    Marital status: Single     Spouse name: Not on file    Number of children: Not on file    Years of education: Not on file    Highest education level: Not on file   Occupational History    Not on file   Tobacco Use    Smoking status: Current Some Day Smoker     Types: Cigarettes    Smokeless tobacco: Never Used   Substance and Sexual Activity    Alcohol use: Yes     Comment: \"a beer today\"    Drug use: Yes     Types: Marijuana Veldon Breath)     Comment: \"Ill be honest, I smoke a blunt\"    Sexual activity: Not on file   Other Topics Concern    Not on file   Social History Narrative    Not on file     Social Determinants of Health     Financial Resource Strain:     Difficulty of Paying Living Expenses: Not on file   Food Insecurity:     Worried About Running Out of Food in the Last Year: Not on file    Nhan of Food in the Last Year: Not on file   Transportation Needs:     Lack of Transportation (Medical): Not on file    Lack of Transportation (Non-Medical):  Not on file   Physical Activity:     Days of Exercise per Week: Not on file    Minutes of Exercise per Session: Not on file   Stress:     Feeling of Stress : Not on file   Social Connections:     Frequency of Communication with Friends and Family: Not on file    Frequency of Social Gatherings with Friends and Family: Not on file    Attends [employer] Services: Not on file    Active Member of Clubs or Organizations: Not on file    Attends Club or Organization Meetings: Not on file    Marital Status: Not on file   Intimate Partner Violence:     Fear of Current or Ex-Partner: Not on file    Emotionally Abused: Not on file    Physically Abused: Not on file    Sexually Abused: Not on file   Housing Stability:     Unable to Pay for Housing in the Last Year: Not on file    Number of [place] in the Last Year: Not on file    Unstable Housing in the Last Year: Not on file       CURRENT MEDICATIONS       Discharge Medication List as of 4/21/2022  8:06 PM          ALLERGIES     Patient has no known allergies. PHYSICAL EXAM       ED Triage Vitals [04/19/22 2126]   BP Temp Temp Source Pulse Resp SpO2 Height Weight   (!) 169/116 98.7 °F (37.1 °C) Oral 68 18 99 % 5' 9\" (1.753 m) 161 lb (73 kg)       Physical Exam  Vitals and nursing note reviewed. Constitutional:       Appearance: He is ill-appearing and diaphoretic. He is not toxic-appearing. HENT:      Head: Normocephalic and atraumatic. Mouth/Throat:      Mouth: Mucous membranes are moist.      Pharynx: Oropharynx is clear. Eyes:      Extraocular Movements: Extraocular movements intact. Pupils: Pupils are equal, round, and reactive to light. Cardiovascular:      Rate and Rhythm: Normal rate and regular rhythm. Pulses: Normal pulses. Heart sounds: Normal heart sounds. Pulmonary:      Effort: Pulmonary effort is normal. No respiratory distress. Chest:      Chest wall: No tenderness. Abdominal:      General: There is no distension. Palpations: Abdomen is soft. Tenderness: There is no abdominal tenderness. There is no guarding or rebound. Musculoskeletal:         General: No signs of injury. Cervical back: Normal range of motion and neck supple. Right lower leg: No edema. Left lower leg: No edema. Skin:     General: Skin is warm. Capillary Refill: Capillary refill takes less than 2 seconds. Neurological:      General: No focal deficit present.       Mental Status: He is alert and oriented to person, place, and time. Psychiatric:         Mood and Affect: Mood normal.         Behavior: Behavior normal.         MDM:   Chart Reviewed: PMH and additional information as noted in HPI obtained from chart review    Vitals:    Vitals:    04/21/22 0732 04/21/22 0736 04/21/22 1514 04/21/22 1615   BP: (!) 166/125 (!) 166/125 (!) 143/105 (!) 143/97   Pulse: 68 68 73    Resp: 16  16    Temp: 98.1 °F (36.7 °C)  99 °F (37.2 °C)    TempSrc: Oral  Oral    SpO2: 97%  99%    Weight:       Height:           PROCEDURES:  Unless otherwise noted below, none  Procedures    LABS:  Labs Reviewed   COMPREHENSIVE METABOLIC PANEL - Abnormal; Notable for the following components:       Result Value    Glucose 132 (*)     Alkaline Phosphatase 107 (*)     All other components within normal limits   CBC WITH AUTO DIFFERENTIAL - Abnormal; Notable for the following components:    RBC 4.41 (*)     Hemoglobin 13.8 (*)     Hematocrit 41.1 (*)     MCH 31.4 (*)     RDW 14.8 (*)     All other components within normal limits   TROPONIN - Abnormal; Notable for the following components:    Troponin 0.426 (*)     All other components within normal limits    Narrative:     Mishel Rodriguez tel. [phone],  TROP  results called to and read back by William, 04/19/2022 22:40, by Stephanie Mancia   CK - Abnormal; Notable for the following components:     Total  (*)     All other components within normal limits   CBC - Abnormal; Notable for the following components:    RBC 4.68 (*)     MCH 31.5 (*)     RDW 14.7 (*)     All other components within normal limits   BASIC METABOLIC PANEL W/ REFLEX TO MG FOR LOW K - Abnormal; Notable for the following components:    Potassium reflex Magnesium 3.3 (*)     Anion Gap 18 (*)     All other components within normal limits   CBC - Abnormal; Notable for the following components:    RBC 4.66 (*)     MCHC 32.9 (*)     All other components within normal limits   LIPID PANEL - Abnormal; Notable for the following components:    HDL 69 (*)     All other components within normal limits   POCT GLUCOSE - Abnormal; Notable for the following components:    POC Glucose 120 (*)     All other components within normal limits   POCT ARTERIAL - Abnormal; Notable for the following components:    ACT-K 368 (*)     All other components within normal limits   POCT GLUCOSE - Normal   PROTIME-INR   APTT   LACTIC ACID   MAGNESIUM   BRAIN NATRIURETIC PEPTIDE    Narrative:     Jairo York  [facility] tel. [phone],  TROP  results called to and read back by William, 04/19/2022 22:40, by Lilli Carloper   ETHANOL   TSH WITH REFLEX    Narrative:     Mony Woo tel. [phone],  TROP  results called to and read back by William, 04/19/2022 22:40, by Leonel Vera, UNFRACTIONATED HEPARIN   ETHANOL   ANTI-XA, UNFRACTIONATED HEPARIN   HEMOGLOBIN A1C   MAGNESIUM   BASIC METABOLIC PANEL W/ REFLEX TO MG FOR LOW K   TYPE AND SCREEN       XR CHEST PORTABLE   Final Result   NO ACUTE ACTIVE CARDIOPULMONARY PROCESS          ED Course as of 04/22/22 0539   Tue Apr 19, 2022 2147 EKG 12 Lead  EKG showing normal sinus rhythm, rate of 60 bpm.  Normal axis. Normal intervals. ST elevation noted in leads III and aVF with reciprocal changes noted in 1, aVL. Additional biphasic T waves noted in V3 with deep T wave inversions noted in V4 through V6. Code purple immediately called in the ED. Page sent to Dr. Edmund Newberry. [NA]      ED Course User Index  [NA] Britta Aaron MD       50 y.o. male with a PMH clinically significant for Asthma, Tobacco abuse, HTN and CAD s/p PCI with stent x1 presenting to the ED via PV c/o chest pain, shortness of breath, diaphoresis, fatigue, nausea, lightheadedness and dizziness that has been intermittent over the past several days, becoming constant last night. Upon initial evaluation, Pt with active chest pain, but otherwise Afebrile, HDS and in NAD. PE as noted above.   Labs, , EKG, and Imaging as noted above or otherwise pending. Given findings, clinical presentation most likely consistent w/ STEMI given EKG findings, hx of CAD and recent symptoms concerning for unstable angina. Elevation noted in the inferior leads with deep T wave inversions noted in the anterolateral leads. Code purple called immediately in the ED. Although considered, low suspicion for dissection, PE. Discussed with Dr. Hoa Palmer, cardiology, who agreed to take patient to the Cath Lab. Administered meds as noted below for STEMI. Chest pain improved with single dose of morphine. Taken to the Cath Lab without further acute events. Pt was administered   Medications   morphine (PF) injection 2 mg (has no administration in time range)   fentaNYL (SUBLIMAZE) injection 25 mcg (has no administration in time range)   midazolam PF (VERSED) injection 2 mg (has no administration in time range)   heparin (porcine) injection (4,000 Units IntraVENous Given 4/19/22 2146)   0.9 % sodium chloride infusion (1,000 mL/hr IntraVENous New Bag 4/19/22 2147)   aspirin chewable tablet (324 mg Oral Given 4/19/22 2147)   ticagrelor (BRILINTA) tablet (180 mg Oral Given 4/19/22 2148)   morphine sulfate (PF) injection 4 mg (4 mg IntraVENous Given 4/19/22 2201)   ondansetron (ZOFRAN) injection 4 mg (4 mg IntraVENous Given 4/19/22 2201)   iopamidol (ISOVUE-300) 61 % injection 174 mL (174 mLs Other Given 4/19/22 2315)   potassium chloride (KLOR-CON M) extended release tablet 40 mEq (40 mEq Oral Given 4/20/22 1049)       Plan: Admit to Cath Lab for further evaluation and management. Report given to Dr. Hoa Palmer. and Patient understanding and amenable to the POC. CRITICAL CARE TIME   Total CriticalCare time was 0 minutes, excluding separately reportable procedures. There was a high probability of clinically significant/life threatening deterioration in the patient's condition which required my urgent intervention.         FINAL IMPRESSION      1. ST elevation myocardial infarction (STEMI), unspecified artery ([facility] [address]    2. CAD S/P percutaneous coronary angioplasty          DISPOSITION/PLAN   DISPOSITION Admitted 04/19/2022 11:46:20 PM      Discharge Medication List as of 4/21/2022  8:06 PM      START taking these medications    Details   aspirin 81 MG chewable tablet Take 1 tablet by mouth daily, Disp-30 tablet, R-3Normal      atorvastatin (LIPITOR) 80 MG tablet Take 1 tablet by mouth nightly, Disp-30 tablet, R-3Normal      spironolactone (ALDACTONE) 25 MG tablet Take 1 tablet by mouth daily, Disp-30 tablet, R-3Normal      ticagrelor (BRILINTA) 90 MG TABS tablet Take 1 tablet by mouth 2 times daily, Disp-60 tablet, R-11Normal      furosemide (LASIX) 20 MG tablet Take 1 tablet by mouth daily, Disp-60 tablet, R-3Normal      carvedilol (COREG) 12.5 MG tablet Take 1 tablet by mouth 2 times daily (with meals), Disp-60 tablet, R-3Normal      sacubitril-valsartan (ENTRESTO) 24-26 MG per tablet Take 1 tablet by mouth 2 times daily, Disp-60 tablet, R-3Normal      nitroGLYCERIN (NITROSTAT) 0.4 MG SL tablet Place 1 tablet under the tongue every 5 minutes as needed for Chest pain (x maximum of 3 doses.  If CP unrelieved after 3 doses, call 911), Disp-25 tablet, R-1Normal      pantoprazole (PROTONIX) 40 MG tablet Take 1 tablet by mouth every morning (before breakfast), Disp-30 tablet, R-3Normal              MD Nuha Reynoso MD  04/22/22 4986

## 2022-04-20 NOTE — BRIEF OP NOTE
Section of Cardiology  Adult Brief Cardiac Cath Procedure Note        Procedure(s):  LHC, b/l coronary angio, successful PCI of the mid LAD    Pre-operative Diagnosis: STEMI    H&P Status: Completed and reviewed. Post-operative Diagnosis: Successful PCI of the mid LAD 99% GIOVANNA III stenosis placement of a 4 x 28 mm KARUNA Xience Kasia, and a 3.5 x 12 mm KARUNA resolute Jer overlap segment postdilated with a 4 mm NC balloon final angiogram showed 0% residual stenosis,  GIOVANNA-3 flow stent looked well opposed well-expanded no dissections    Findings:  See full report  Left main: Big size vessel mild disease  LAD: Big sized vessel wraps around the corner proximal focal 99% stenosis status post PCI as described above  Circumflex: Big size vessel dominant proximal to mid segment stent widely patent  RCA: Small size vessel nondominant proximal to mid segment severe stenosis  EF 40% by LV gram with inferior wall hypokinesis  LVEDP 30 mmHg  No aortic valve gradient on catheter pullback    Summary of successful PCI of the mid LAD  Right radial artery for access  6 [place] sheath  6 [place] 4.0 EBU guide  BMW 0.014 wire successfully cannulated into the LAD  2.0 x 12mm NC balloon was used to predilate the lesion  A 4 x 28 mm Xience Kasia KARUNA was placed in the mid segment across diagonal 1 branch and subsequently a 3.5 x 12 mm resolute [place] KARUNA distal to the prior stent overlapping.   Overlap segment postdilated to 4 mm with a 4 x 12 mm NC balloon inflated at maximal pressures  Final angiogram showed 0% residual stenosis preservation of GIOVANNA-3 flow stent looked well opposed well-expanded no dissections    Recommendations:  Transfer patient to ICU for hemodynamic monitoring  Patient was loaded with 180 mg of ticagrelor in the ER, continue ticagrelor 90 mg p.o. twice daily  Continue aspirin and atorvastatin indefinitely for secondary prevention of CAD  Start beta-blocker and ARB  Obtain echocardiogram in the morning  Follow-up labs in the morning    Complications: None    Primary Proceduralist:   Juan Colin MD    Full procedure note to follow

## 2022-04-20 NOTE — CONSULTS
Cardiac Rehab Consult Note  Name: Shreyas Haines  Age: 50 y.o. Gender: male    Chief Complaint:Chest Pain (intermittent since last night, \"smoked a cigarette before\")    Primary Care Provider: Rosemarie Mcfarlane  [facility] Team: Treatment Team: Attending Provider: Merari Howard MD; Consulting Physician: Merari Howard MD; Registered Nurse: Marta Bobby, RN; : Carly Rodriguez; Utilization Reviewer: Page Bearden RN; Nursing Student: Yvonne Garrison; Registered Nurse: Janelle Rome RN  Admission Date: 4/19/2022    Consult Received from Dr. Rosalba Andrews. Reason for consult PCI. Patient visited at bedside. Program and benefits introduced. Brochures given. Patient's response was very interested. Principal Problem:    STEMI (ST elevation myocardial infarction) ([address]  Resolved Problems:    * No resolved hospital problems. *       Plan: Will follow up at discharge    All of pt questions were answered. Case will be discussed with physician when appropriate.      Electronically signed by Anjana Willis RD, LD on 4/20/2022 at 11:31 AM

## 2022-04-20 NOTE — FLOWSHEET NOTE
4/20 Skin assessment completed by two nurses. Rambo Garza and TARIQ Medina. One skin area of concern on R ankle skin rash. Patient states that rash is diagnoses eczema.

## 2022-04-20 NOTE — H&P
DEPARTMENT OF CARDIOLOGY  HISTORY AND PHYSICAL EXAM    PATIENT NAME:  Óscar Olivares    MRN:  [id number]  SERVICE DATE:  4/19/2022   SERVICE TIME:  11:48 PM    Primary Care Physician: González Little     SUBJECTIVE  CHIEF COMPLAINT:    STEMI    HPI:    53-year-old male history of CAD status post PCI to the circumflex in 2017, hypertension, hyperlipidemia, active smoker who came to the ER for worsening chest pain  Patient reports that yesterday started complaining of chest pain sharp radiating to the left chest into the back intermittently. This evening patient had another episode of chest pain which he described similar to the chest pain he had when he underwent PCI. In the ER   EKG showing sinus rhythm with deep T wave inversions in the lateral leads  Given the patient was still having chest pain patient was taken emergently to the Cath Lab  Patient was found with 99% stenosis of the mid LAD status post successful PCI with 2 stents placed (4 x 12 mm and 3.5 x 12 mm)  Patient tolerated the procedure well without complications and was transferred to ICU hemodynamically stable    PAST MEDICAL HISTORY:    Past Medical History:   Diagnosis Date    Asthma     Hypertension     MI (myocardial infarction) ([address]      PAST SURGICAL HISTORY:    Past Surgical History:   Procedure Laterality Date    CORONARY ANGIOPLASTY WITH STENT PLACEMENT      MOUTH SURGERY       FAMILY HISTORY:  No family history on file.   SOCIAL HISTORY:    Social History     Socioeconomic History    Marital status: Single     Spouse name: Not on file    Number of children: Not on file    Years of education: Not on file    Highest education level: Not on file   Occupational History    Not on file   Tobacco Use    Smoking status: Current Some Day Smoker     Types: Cigarettes    Smokeless tobacco: Never Used   Substance and Sexual Activity    Alcohol use: Yes     Comment: \"a beer today\"    Drug use: Yes     Types: Marijuana (Weed) Comment: \"Ill be honest, I smoke a blunt\"    Sexual activity: Not on file   Other Topics Concern    Not on file   Social History Narrative    Not on file     Social Determinants of Health     Financial Resource Strain:     Difficulty of Paying Living Expenses: Not on file   Food Insecurity:     Worried About Running Out of Food in the Last Year: Not on file    Nhan of Food in the Last Year: Not on file   Transportation Needs:     Lack of Transportation (Medical): Not on file    Lack of Transportation (Non-Medical): Not on file   Physical Activity:     Days of Exercise per Week: Not on file    Minutes of Exercise per Session: Not on file   Stress:     Feeling of Stress : Not on file   Social Connections:     Frequency of Communication with Friends and Family: Not on file    Frequency of Social Gatherings with Friends and Family: Not on file    Attends [employer] Services: Not on file    Active Member of [address] [place] [address] or Organizations: Not on file    Attends Club or Organization Meetings: Not on file    Marital Status: Not on file   Intimate Partner Violence:     Fear of Current or Ex-Partner: Not on file    Emotionally Abused: Not on file    Physically Abused: Not on file    Sexually Abused: Not on file   Housing Stability:     Unable to Pay for Housing in the Last Year: Not on file    Number of [place] in the Last Year: Not on file    Unstable Housing in the Last Year: Not on file     MEDICATIONS:   Prior to Admission medications    Not on File       ALLERGIES: Patient has no known allergies. REVIEW OF SYSTEM:   Review of Systems   Constitutional: Negative for activity change, chills, diaphoresis and fever. HENT: Negative for congestion, ear pain, nosebleeds and rhinorrhea. Eyes: Negative for redness and visual disturbance. Respiratory: Positive for chest tightness. Negative for apnea, cough, shortness of breath and wheezing.     Cardiovascular: Negative for chest pain, palpitations and leg swelling. Gastrointestinal: Negative for abdominal pain, constipation, diarrhea, nausea and vomiting. Genitourinary: Negative for difficulty urinating and dysuria. Musculoskeletal: Negative. Negative for joint swelling. Skin: Negative for color change, rash and wound. Neurological: Negative for dizziness, syncope, weakness, numbness and headaches. Psychiatric/Behavioral: Negative. OBJECTIVE  PHYSICAL EXAM:   Physical Exam  Vitals and nursing note reviewed. Constitutional:       Appearance: Normal appearance. HENT:      Head: Normocephalic and atraumatic. Mouth/Throat:      Mouth: Mucous membranes are moist.      Pharynx: Oropharynx is clear. Eyes:      Extraocular Movements: Extraocular movements intact. Conjunctiva/sclera: Conjunctivae normal.      Pupils: Pupils are equal, round, and reactive to light. Cardiovascular:      Rate and Rhythm: Normal rate and regular rhythm. Pulses: Normal pulses. Heart sounds: Normal heart sounds. Pulmonary:      Effort: Pulmonary effort is normal.      Breath sounds: Normal breath sounds. Abdominal:      General: Abdomen is flat. Bowel sounds are normal.      Palpations: Abdomen is soft. Musculoskeletal:         General: Normal range of motion. Cervical back: Normal range of motion and neck supple. Skin:     General: Skin is warm. Neurological:      General: No focal deficit present. Mental Status: He is alert and oriented to person, place, and time. Mental status is at baseline. Psychiatric:         Mood and Affect: Mood normal.          BP (!) 156/111   Pulse 92   Temp 98.7 °F (37.1 °C) (Oral)   Resp 18   Ht 5' 10\" (1.778 m)   Wt 160 lb (72.6 kg)   SpO2 95%   BMI 22.96 kg/m²     DATA:     Diagnostic tests reviewed for today's visit:    Most recent labs and imaging results reviewed.      LABS:    Recent Results (from the past 24 hour(s))   EKG 12 Lead    Collection Time: 04/19/22  9:38 PM   Result Value Ref Range    Ventricular Rate 60 BPM    Atrial Rate 60 BPM    P-R Interval 166 ms    QRS Duration 100 ms    Q-T Interval 478 ms    QTc Calculation (Bazett) 478 ms    P Axis 16 degrees    R Axis -22 degrees    T Axis 131 degrees   POCT Glucose    Collection Time: 04/19/22  9:45 PM   Result Value Ref Range    POC Glucose 120 (H) 70 - 99 mg/dl    Performed on ACCU-CHEK    Protime-INR    Collection Time: 04/19/22  9:54 PM   Result Value Ref Range    Protime 13.1 12.3 - 14.9 sec    INR 1.0    APTT    Collection Time: 04/19/22  9:54 PM   Result Value Ref Range    aPTT 29.4 24.4 - 36.8 sec   Comprehensive Metabolic Panel    Collection Time: 04/19/22  9:54 PM   Result Value Ref Range    Sodium 137 135 - 144 mEq/L    Potassium 3.4 3.4 - 4.9 mEq/L    Chloride 101 95 - 107 mEq/L    CO2 26 20 - 31 mEq/L    Anion Gap 10 9 - 15 mEq/L    Glucose 132 (H) 70 - 99 mg/dL    BUN 9 6 - 20 mg/dL    CREATININE 1.02 0.70 - 1.20 mg/dL    GFR Non-African American >60.0 >60    GFR  >60.0 >60    Calcium 9.5 8.5 - 9.9 mg/dL    Total Protein 7.3 6.3 - 8.0 g/dL    Albumin 4.2 3.5 - 4.6 g/dL    Total Bilirubin 0.5 0.2 - 0.7 mg/dL    Alkaline Phosphatase 107 (H) 35 - 104 U/L    ALT 18 0 - 41 U/L    AST 29 0 - 40 U/L    Globulin 3.1 2.3 - 3.5 g/dL   Magnesium    Collection Time: 04/19/22  9:54 PM   Result Value Ref Range    Magnesium 2.0 1.7 - 2.4 mg/dL   Troponin    Collection Time: 04/19/22  9:54 PM   Result Value Ref Range    Troponin 0.426 (HH) 0.000 - 0.010 ng/mL   Brain Natriuretic Peptide    Collection Time: 04/19/22  9:54 PM   Result Value Ref Range    Pro-BNP 3,503 pg/mL   CK    Collection Time: 04/19/22  9:54 PM   Result Value Ref Range    Total  (H) 0 - 190 U/L   TSH with Reflex    Collection Time: 04/19/22  9:54 PM   Result Value Ref Range    TSH 0.913 0.440 - 3.860 uIU/mL   CBC with Auto Differential    Collection Time: 04/19/22  9:55 PM   Result Value Ref Range    WBC 7.5 4.8 - 10.8 K/uL    RBC 4.41 (L) 4.70 - 6.10 M/uL    Hemoglobin 13.8 (L) 14.0 - 18.0 g/dL    Hematocrit 41.1 (L) 42.0 - 52.0 %    MCV 93.2 80.0 - 100.0 fL    MCH 31.4 (H) 27.0 - 31.3 pg    MCHC 33.7 33.0 - 37.0 %    RDW 14.8 (H) 11.5 - 14.5 %    Platelets 905 340 - 754 K/uL    Neutrophils % 58.3 %    Lymphocytes % 24.7 %    Monocytes % 10.7 %    Eosinophils % 5.3 %    Basophils % 1.0 %    Neutrophils Absolute 4.4 1.4 - 6.5 K/uL    Lymphocytes Absolute 1.8 1.0 - 4.8 K/uL    Monocytes Absolute 0.8 0.2 - 0.8 K/uL    Eosinophils Absolute 0.4 0.0 - 0.7 K/uL    Basophils Absolute 0.1 0.0 - 0.2 K/uL   TYPE AND SCREEN    Collection Time: 04/19/22 10:00 PM   Result Value Ref Range    ABO/Rh A POS     Antibody Screen NEG    Lactic Acid    Collection Time: 04/19/22 10:00 PM   Result Value Ref Range    Lactic Acid 0.9 0.5 - 2.2 mmol/L   POCT Glucose    Collection Time: 04/19/22 10:00 PM   Result Value Ref Range    Glucose 120 mg/dL    QC OK? yes    Ethanol    Collection Time: 04/19/22 10:00 PM   Result Value Ref Range    Ethanol Lvl <10 mg/dL    Ethanol percent Not indicated G/dL   POCT Arterial    Collection Time: 04/19/22 10:51 PM   Result Value Ref Range    ACT-K 368 (H) 82 - 152 sec    Sample Type ART     Performed on SEE BELOW        IMAGING:  No results found. VTE Prophylaxis: unfractionated heparin -  start    ASSESSMENT AND PLAN  STEMI.   Culprit lesion mid LAD status post successful PCI placement of 2 drug-eluting stents (4 x 28 mm and 3.5 x 12 mm)  History of prior PCI to the circumflex 2017 (stent wide open)  Active smoker  Hypertension  Hyperlipidemia    Plan:  Transfer patient to ICU for hemodynamic monitoring  Patient was loaded with 180 mg of ticagrelor in the ER, continue ticagrelor 90 mg p.o. twice daily  Continue aspirin and atorvastatin indefinitely for secondary prevention of CAD  Start beta-blocker and ARB  Obtain echocardiogram in the morning  Follow-up labs in the morning      Plan of care discussed with: patient    SIGNATURE: Noel Fonseca Ramirez Joy MD  DATE: April 19, 2022  TIME: 11:48 PM

## 2022-04-20 NOTE — ED TRIAGE NOTES
Pt to ed from home via triage with c/o chest pain, onset yesterday  Pt reports onset of pain while at rest  Pt states he quit smoking \"for a while\", reports that he smoked  a cigarette yesterday and CP started. Pain described as sharp, radiating from left chest to right chest and back intermittently  Pt reports hx of HTN, non currently medicated and does not have a pcp  Pt admits to drinking \"one beer\" and \"smoking a blunt\" pta  Pt cap refill less than 3 sec  Respirations even and unlabored. Skin WDI.  Pt amb with steady gait

## 2022-04-20 NOTE — PROGRESS NOTES
Received patient into room 193, VS stable, denies pain, SOB, discomfort. Up and about in room independently with no device, steady on feet. Oriented to room, call light, BR and unit.

## 2022-04-20 NOTE — CARE COORDINATION
Definition and description of a heart attack explained. Brief overview of the heart anatomy reviewed with pt. CAD progression discussed. During a MI, lack of oxygen and damage to heart muscle explained. Symptoms of a MI reviewed. Pt. verbalizes that they experienced: chest pain and SOB  Post MI complications reviewed including arrhythmias, pumping problems, inflammation (pericarditis). Hospital course reviewed including testing: Lab work, B/P, ECG, ECHO, Stress testing, and Heart Cath. Treatments also reviewed from medication, angioplasty and stenting, to CABG. Patient had: Emergent PCI and stenting  Plan post discharge includes: Cardiac rehab and f/u with cardiology  Physical activity discussed including cardiac rehab. Pt advised to follow their physician's discharge instructions for activity restrictions, if any. Risk factors reviewed including: smoking, high cholesterol, HTN, DM, obesity, sedentary lifestyle, and stress. Pt. encouraged to make lifestyle changes to improve his health and lower these risk factors. Stress and depression were also discussed. S/S depression reviewed as well as encouragement to talk with someone if symptoms are noticed. Importance of follow up with the cardiologist reinforced. MI Zone booklet also reviewed. Goal is to keep patient in the \"green\" zone. He verbalizes understanding to call the doctor ASAP when experiencing symptoms in the \"yellow\" zone. Instructed to call 911 when S/S of \"red\" zone begin. Reminder to never drive after taking nitroglycerine. Copy of MI booklet and zone pamphlet provided to the patient for review. Patient denies further questions at this time.    Electronically signed by Shayne Moy RN on 4/20/2022 at 1:03 PM

## 2022-04-20 NOTE — PROGRESS NOTES
DEPARTMENT OF CARDIOLOGY  Progress note    PATIENT NAME:  Valentine Hunter    MRN:  [id number]  SERVICE DATE:  4/20/2022   SERVICE TIME:  1:55 PM    Primary Care Physician: Maryjane Sahu     SUBJECTIVE  CHIEF COMPLAINT:    STEMI    HPI:    53-year-old male history of CAD status post PCI to the circumflex in 2017, hypertension, hyperlipidemia, active smoker who came to the ER for worsening chest pain  Patient reports that yesterday started complaining of chest pain sharp radiating to the left chest into the back intermittently. This evening patient had another episode of chest pain which he described similar to the chest pain he had when he underwent PCI. In the ER   EKG showing sinus rhythm with deep T wave inversions in the lateral leads  Given the patient was still having chest pain patient was taken emergently to the Cath Lab  Patient was found with 99% stenosis of the mid LAD status post successful PCI with 2 stents placed (4 x 12 mm and 3.5 x 12 mm)  Patient tolerated the procedure well without complications and was transferred to ICU hemodynamically stable  =======================  Hospital course  4/20/2022  Patient laying in bed comfortably  Denies chest pain  Yesterday patient underwent successful PCI of the mid LAD after coming into the hospital as a STEMI  Echocardiogram showing severe LV dysfunction EF 20%    PAST MEDICAL HISTORY:    Past Medical History:   Diagnosis Date    Asthma     Hypertension     MI (myocardial infarction) ([address]      PAST SURGICAL HISTORY:    Past Surgical History:   Procedure Laterality Date    CORONARY ANGIOPLASTY WITH STENT PLACEMENT      MOUTH SURGERY       FAMILY HISTORY:  No family history on file.   SOCIAL HISTORY:    Social History     Socioeconomic History    Marital status: Single     Spouse name: Not on file    Number of children: Not on file    Years of education: Not on file    Highest education level: Not on file   Occupational History    Not on file   Tobacco Use    Smoking status: Current Some Day Smoker     Types: Cigarettes    Smokeless tobacco: Never Used   Substance and Sexual Activity    Alcohol use: Yes     Comment: \"a beer today\"    Drug use: Yes     Types: Marijuana Darliss Meeter)     Comment: \"Ill be honest, I smoke a blunt\"    Sexual activity: Not on file   Other Topics Concern    Not on file   Social History Narrative    Not on file     Social Determinants of Health     Financial Resource Strain:     Difficulty of Paying Living Expenses: Not on file   Food Insecurity:     Worried About Running Out of Food in the Last Year: Not on file    Nhan of Food in the Last Year: Not on file   Transportation Needs:     Lack of Transportation (Medical): Not on file    Lack of Transportation (Non-Medical): Not on file   Physical Activity:     Days of Exercise per Week: Not on file    Minutes of Exercise per Session: Not on file   Stress:     Feeling of Stress : Not on file   Social Connections:     Frequency of Communication with Friends and Family: Not on file    Frequency of Social Gatherings with Friends and Family: Not on file    Attends [employer] Services: Not on file    Active Member of [address] [place] [address] or Organizations: Not on file    Attends Club or Organization Meetings: Not on file    Marital Status: Not on file   Intimate Partner Violence:     Fear of Current or Ex-Partner: Not on file    Emotionally Abused: Not on file    Physically Abused: Not on file    Sexually Abused: Not on file   Housing Stability:     Unable to Pay for Housing in the Last Year: Not on file    Number of [place] in the Last Year: Not on file    Unstable Housing in the Last Year: Not on file     MEDICATIONS:   Prior to Admission medications    Not on File       ALLERGIES: Patient has no known allergies. REVIEW OF SYSTEM:   Review of Systems   Constitutional: Negative for activity change, chills, diaphoresis and fever.    HENT: Negative for congestion, ear pain, nosebleeds and rhinorrhea. Eyes: Negative for redness and visual disturbance. Respiratory: Positive for chest tightness. Negative for apnea, cough, shortness of breath and wheezing. Cardiovascular: Negative for chest pain, palpitations and leg swelling. Gastrointestinal: Negative for abdominal pain, constipation, diarrhea, nausea and vomiting. Genitourinary: Negative for difficulty urinating and dysuria. Musculoskeletal: Negative. Negative for joint swelling. Skin: Negative for color change, rash and wound. Neurological: Negative for dizziness, syncope, weakness, numbness and headaches. Psychiatric/Behavioral: Negative. OBJECTIVE  PHYSICAL EXAM:   Physical Exam  Vitals and nursing note reviewed. Constitutional:       Appearance: Normal appearance. HENT:      Head: Normocephalic and atraumatic. Mouth/Throat:      Mouth: Mucous membranes are moist.      Pharynx: Oropharynx is clear. Eyes:      Extraocular Movements: Extraocular movements intact. Conjunctiva/sclera: Conjunctivae normal.      Pupils: Pupils are equal, round, and reactive to light. Cardiovascular:      Rate and Rhythm: Normal rate and regular rhythm. Pulses: Normal pulses. Heart sounds: Normal heart sounds. Pulmonary:      Effort: Pulmonary effort is normal.      Breath sounds: Normal breath sounds. Abdominal:      General: Abdomen is flat. Bowel sounds are normal.      Palpations: Abdomen is soft. Musculoskeletal:         General: Normal range of motion. Cervical back: Normal range of motion and neck supple. Skin:     General: Skin is warm. Neurological:      General: No focal deficit present. Mental Status: He is alert and oriented to person, place, and time. Mental status is at baseline.    Psychiatric:         Mood and Affect: Mood normal.          BP (!) 149/98   Pulse 86   Temp 98.4 °F (36.9 °C) (Oral)   Resp 15   Ht 5' 10\" (1.778 m)   Wt 160 lb (72.6 kg)   SpO2 100%   BMI 22.96 kg/m²     DATA:     Diagnostic tests reviewed for today's visit:    Most recent labs and imaging results reviewed.      LABS:    Recent Results (from the past 24 hour(s))   EKG 12 Lead    Collection Time: 04/19/22  9:38 PM   Result Value Ref Range    Ventricular Rate 60 BPM    Atrial Rate 60 BPM    P-R Interval 166 ms    QRS Duration 100 ms    Q-T Interval 478 ms    QTc Calculation (Bazett) 478 ms    P Axis 16 degrees    R Axis -22 degrees    T Axis 131 degrees   POCT Glucose    Collection Time: 04/19/22  9:45 PM   Result Value Ref Range    POC Glucose 120 (H) 70 - 99 mg/dl    Performed on ACCU-CHEK    Protime-INR    Collection Time: 04/19/22  9:54 PM   Result Value Ref Range    Protime 13.1 12.3 - 14.9 sec    INR 1.0    APTT    Collection Time: 04/19/22  9:54 PM   Result Value Ref Range    aPTT 29.4 24.4 - 36.8 sec   Comprehensive Metabolic Panel    Collection Time: 04/19/22  9:54 PM   Result Value Ref Range    Sodium 137 135 - 144 mEq/L    Potassium 3.4 3.4 - 4.9 mEq/L    Chloride 101 95 - 107 mEq/L    CO2 26 20 - 31 mEq/L    Anion Gap 10 9 - 15 mEq/L    Glucose 132 (H) 70 - 99 mg/dL    BUN 9 6 - 20 mg/dL    CREATININE 1.02 0.70 - 1.20 mg/dL    GFR Non-African American >60.0 >60    GFR  >60.0 >60    Calcium 9.5 8.5 - 9.9 mg/dL    Total Protein 7.3 6.3 - 8.0 g/dL    Albumin 4.2 3.5 - 4.6 g/dL    Total Bilirubin 0.5 0.2 - 0.7 mg/dL    Alkaline Phosphatase 107 (H) 35 - 104 U/L    ALT 18 0 - 41 U/L    AST 29 0 - 40 U/L    Globulin 3.1 2.3 - 3.5 g/dL   Magnesium    Collection Time: 04/19/22  9:54 PM   Result Value Ref Range    Magnesium 2.0 1.7 - 2.4 mg/dL   Troponin    Collection Time: 04/19/22  9:54 PM   Result Value Ref Range    Troponin 0.426 (HH) 0.000 - 0.010 ng/mL   Brain Natriuretic Peptide    Collection Time: 04/19/22  9:54 PM   Result Value Ref Range    Pro-BNP 3,503 pg/mL   CK    Collection Time: 04/19/22  9:54 PM   Result Value Ref Range    Total  (H) 0 - 190 U/L   TSH with Reflex    Collection Time: 04/19/22  9:54 PM   Result Value Ref Range    TSH 0.913 0.440 - 3.860 uIU/mL   CBC with Auto Differential    Collection Time: 04/19/22  9:55 PM   Result Value Ref Range    WBC 7.5 4.8 - 10.8 K/uL    RBC 4.41 (L) 4.70 - 6.10 M/uL    Hemoglobin 13.8 (L) 14.0 - 18.0 g/dL    Hematocrit 41.1 (L) 42.0 - 52.0 %    MCV 93.2 80.0 - 100.0 fL    MCH 31.4 (H) 27.0 - 31.3 pg    MCHC 33.7 33.0 - 37.0 %    RDW 14.8 (H) 11.5 - 14.5 %    Platelets 903 490 - 478 K/uL    Neutrophils % 58.3 %    Lymphocytes % 24.7 %    Monocytes % 10.7 %    Eosinophils % 5.3 %    Basophils % 1.0 %    Neutrophils Absolute 4.4 1.4 - 6.5 K/uL    Lymphocytes Absolute 1.8 1.0 - 4.8 K/uL    Monocytes Absolute 0.8 0.2 - 0.8 K/uL    Eosinophils Absolute 0.4 0.0 - 0.7 K/uL    Basophils Absolute 0.1 0.0 - 0.2 K/uL   TYPE AND SCREEN    Collection Time: 04/19/22 10:00 PM   Result Value Ref Range    ABO/Rh A POS     Antibody Screen NEG    Lactic Acid    Collection Time: 04/19/22 10:00 PM   Result Value Ref Range    Lactic Acid 0.9 0.5 - 2.2 mmol/L   POCT Glucose    Collection Time: 04/19/22 10:00 PM   Result Value Ref Range    Glucose 120 mg/dL    QC OK?  yes    Ethanol    Collection Time: 04/19/22 10:00 PM   Result Value Ref Range    Ethanol Lvl <10 mg/dL    Ethanol percent Not indicated G/dL   POCT Arterial    Collection Time: 04/19/22 10:51 PM   Result Value Ref Range    ACT-K 368 (H) 82 - 152 sec    Sample Type ART     Performed on SEE BELOW    EKG 12 lead    Collection Time: 04/20/22 12:11 AM   Result Value Ref Range    Ventricular Rate 52 BPM    Atrial Rate 52 BPM    P-R Interval 188 ms    QRS Duration 104 ms    Q-T Interval 514 ms    QTc Calculation (Bazett) 478 ms    P Axis 37 degrees    R Axis 79 degrees    T Axis 149 degrees   Anti-Xa, Unfractionated Heparin    Collection Time: 04/20/22  5:04 AM   Result Value Ref Range    Anti-XA Unfrac Heparin <0.10 IU/mL   CBC    Collection Time: 04/20/22  5:32 AM   Result Value Ref Range WBC 6.5 4.8 - 10.8 K/uL    RBC 4.68 (L) 4.70 - 6.10 M/uL    Hemoglobin 14.7 14.0 - 18.0 g/dL    Hematocrit 44.0 42.0 - 52.0 %    MCV 94.0 80.0 - 100.0 fL    MCH 31.5 (H) 27.0 - 31.3 pg    MCHC 33.5 33.0 - 37.0 %    RDW 14.7 (H) 11.5 - 14.5 %    Platelets 075 170 - 551 K/uL   Basic Metabolic Panel w/ Reflex to MG    Collection Time: 04/20/22  5:33 AM   Result Value Ref Range    Sodium 139 135 - 144 mEq/L    Potassium reflex Magnesium 3.3 (L) 3.4 - 4.9 mEq/L    Chloride 96 95 - 107 mEq/L    CO2 25 20 - 31 mEq/L    Anion Gap 18 (H) 9 - 15 mEq/L    Glucose 86 70 - 99 mg/dL    BUN 9 6 - 20 mg/dL    CREATININE 1.09 0.70 - 1.20 mg/dL    GFR Non-African American >60.0 >60    GFR  >60.0 >60    Calcium 9.0 8.5 - 9.9 mg/dL   Magnesium    Collection Time: 04/20/22  5:33 AM   Result Value Ref Range    Magnesium 2.0 1.7 - 2.4 mg/dL   Hemoglobin A1C    Collection Time: 04/20/22  6:00 AM   Result Value Ref Range    Hemoglobin A1C 5.0 4.8 - 5.9 %   Ethanol    Collection Time: 04/20/22  9:38 AM   Result Value Ref Range    Ethanol Lvl <10 mg/dL    Ethanol percent Not indicated G/dL   Anti-Xa, Unfractionated Heparin    Collection Time: 04/20/22  9:38 AM   Result Value Ref Range    Anti-XA Unfrac Heparin 0.28 IU/mL   EKG 12 lead    Collection Time: 04/20/22  9:42 AM   Result Value Ref Range    Ventricular Rate 71 BPM    Atrial Rate 67 BPM    QRS Duration 150 ms    Q-T Interval 510 ms    QTc Calculation (Bazett) 554 ms    P Axis 36 degrees    R Axis 1 degrees    T Axis 154 degrees       IMAGING:  No results found. VTE Prophylaxis: unfractionated heparin -  start    ASSESSMENT AND PLAN  STEMI.   Culprit lesion mid LAD status post successful PCI placement of 2 drug-eluting stents (4 x 28 mm and 3.5 x 12 mm)  Severe ischemic cardiomyopathy EF 20% by TTE 4/20/2022  History of prior PCI to the circumflex 2017 (stent wide open)  Active smoker  Hypertension  Hyperlipidemia    TTE 4/20/2022 EF 20% with global

## 2022-04-21 VITALS
OXYGEN SATURATION: 99 % | TEMPERATURE: 99 F | RESPIRATION RATE: 16 BRPM | BODY MASS INDEX: 22.36 KG/M2 | WEIGHT: 156.2 LBS | HEIGHT: 70 IN | SYSTOLIC BLOOD PRESSURE: 143 MMHG | HEART RATE: 73 BPM | DIASTOLIC BLOOD PRESSURE: 97 MMHG

## 2022-04-21 LAB
ANION GAP SERPL CALCULATED.3IONS-SCNC: 14 MEQ/L (ref 9–15)
BUN BLDV-MCNC: 9 MG/DL (ref 6–20)
CALCIUM SERPL-MCNC: 9.3 MG/DL (ref 8.5–9.9)
CHLORIDE BLD-SCNC: 98 MEQ/L (ref 95–107)
CHOLESTEROL, TOTAL: 179 MG/DL (ref 0–199)
CO2: 24 MEQ/L (ref 20–31)
CREAT SERPL-MCNC: 1.1 MG/DL (ref 0.7–1.2)
GFR AFRICAN AMERICAN: >60
GFR NON-AFRICAN AMERICAN: >60
GLUCOSE BLD-MCNC: 84 MG/DL (ref 70–99)
HCT VFR BLD CALC: 44 % (ref 42–52)
HDLC SERPL-MCNC: 69 MG/DL (ref 40–59)
HEMOGLOBIN: 14.5 G/DL (ref 14–18)
LDL CHOLESTEROL CALCULATED: 96 MG/DL (ref 0–129)
MCH RBC QN AUTO: 31.1 PG (ref 27–31.3)
MCHC RBC AUTO-ENTMCNC: 32.9 % (ref 33–37)
MCV RBC AUTO: 94.5 FL (ref 80–100)
PDW BLD-RTO: 14.2 % (ref 11.5–14.5)
PLATELET # BLD: 220 K/UL (ref 130–400)
POTASSIUM REFLEX MAGNESIUM: 3.8 MEQ/L (ref 3.4–4.9)
RBC # BLD: 4.66 M/UL (ref 4.7–6.1)
SODIUM BLD-SCNC: 136 MEQ/L (ref 135–144)
TRIGL SERPL-MCNC: 71 MG/DL (ref 0–150)
WBC # BLD: 6.5 K/UL (ref 4.8–10.8)

## 2022-04-21 PROCEDURE — APPSS30 APP SPLIT SHARED TIME 16-30 MINUTES: Performed by: NURSE PRACTITIONER

## 2022-04-21 PROCEDURE — 80048 BASIC METABOLIC PNL TOTAL CA: CPT

## 2022-04-21 PROCEDURE — 6370000000 HC RX 637 (ALT 250 FOR IP): Performed by: INTERNAL MEDICINE

## 2022-04-21 PROCEDURE — 80061 LIPID PANEL: CPT

## 2022-04-21 PROCEDURE — 6360000002 HC RX W HCPCS: Performed by: INTERNAL MEDICINE

## 2022-04-21 PROCEDURE — 36415 COLL VENOUS BLD VENIPUNCTURE: CPT

## 2022-04-21 PROCEDURE — 2580000003 HC RX 258: Performed by: INTERNAL MEDICINE

## 2022-04-21 PROCEDURE — 85027 COMPLETE CBC AUTOMATED: CPT

## 2022-04-21 RX ORDER — PANTOPRAZOLE SODIUM 40 MG/1
40 TABLET, DELAYED RELEASE ORAL
Qty: 30 TABLET | Refills: 3 | Status: SHIPPED | OUTPATIENT
Start: 2022-04-22

## 2022-04-21 RX ORDER — FUROSEMIDE 20 MG/1
20 TABLET ORAL DAILY
Qty: 60 TABLET | Refills: 3 | Status: SHIPPED | OUTPATIENT
Start: 2022-04-22 | End: 2022-05-31 | Stop reason: ALTCHOICE

## 2022-04-21 RX ORDER — NITROGLYCERIN 0.4 MG/1
0.4 TABLET SUBLINGUAL EVERY 5 MIN PRN
Qty: 25 TABLET | Refills: 1 | Status: SHIPPED | OUTPATIENT
Start: 2022-04-21 | End: 2022-06-24

## 2022-04-21 RX ORDER — ATORVASTATIN CALCIUM 80 MG/1
80 TABLET, FILM COATED ORAL NIGHTLY
Qty: 30 TABLET | Refills: 3 | Status: SHIPPED | OUTPATIENT
Start: 2022-04-21 | End: 2022-05-18 | Stop reason: SDUPTHER

## 2022-04-21 RX ORDER — ASPIRIN 81 MG/1
81 TABLET, CHEWABLE ORAL DAILY
Qty: 30 TABLET | Refills: 3 | Status: SHIPPED | OUTPATIENT
Start: 2022-04-22 | End: 2022-05-18 | Stop reason: SDUPTHER

## 2022-04-21 RX ORDER — CARVEDILOL 12.5 MG/1
12.5 TABLET ORAL 2 TIMES DAILY WITH MEALS
Qty: 60 TABLET | Refills: 3 | Status: SHIPPED | OUTPATIENT
Start: 2022-04-21 | End: 2022-05-18 | Stop reason: SDUPTHER

## 2022-04-21 RX ORDER — SPIRONOLACTONE 25 MG/1
25 TABLET ORAL DAILY
Qty: 30 TABLET | Refills: 3 | Status: SHIPPED | OUTPATIENT
Start: 2022-04-22 | End: 2022-05-18 | Stop reason: SDUPTHER

## 2022-04-21 RX ADMIN — CARVEDILOL 12.5 MG: 12.5 TABLET, FILM COATED ORAL at 16:22

## 2022-04-21 RX ADMIN — PANTOPRAZOLE SODIUM 40 MG: 40 TABLET, DELAYED RELEASE ORAL at 07:37

## 2022-04-21 RX ADMIN — SPIRONOLACTONE 25 MG: 25 TABLET ORAL at 07:37

## 2022-04-21 RX ADMIN — ENOXAPARIN SODIUM 40 MG: 40 INJECTION SUBCUTANEOUS at 07:35

## 2022-04-21 RX ADMIN — CARVEDILOL 12.5 MG: 12.5 TABLET, FILM COATED ORAL at 07:36

## 2022-04-21 RX ADMIN — TICAGRELOR 90 MG: 90 TABLET ORAL at 07:37

## 2022-04-21 RX ADMIN — ASPIRIN 81 MG 81 MG: 81 TABLET ORAL at 07:37

## 2022-04-21 RX ADMIN — HYDRALAZINE HYDROCHLORIDE 10 MG: 20 INJECTION, SOLUTION INTRAMUSCULAR; INTRAVENOUS at 07:37

## 2022-04-21 RX ADMIN — FUROSEMIDE 20 MG: 20 TABLET ORAL at 07:37

## 2022-04-21 RX ADMIN — LOSARTAN POTASSIUM 25 MG: 25 TABLET, FILM COATED ORAL at 07:36

## 2022-04-21 RX ADMIN — Medication 10 ML: at 07:38

## 2022-04-21 ASSESSMENT — ENCOUNTER SYMPTOMS
APNEA: 0
RHINORRHEA: 0
ABDOMINAL PAIN: 0
DIARRHEA: 0
SHORTNESS OF BREATH: 0
CONSTIPATION: 0
EYE REDNESS: 0
NAUSEA: 0
CHEST TIGHTNESS: 1
VOMITING: 0
COLOR CHANGE: 0
WHEEZING: 0
COUGH: 0

## 2022-04-21 ASSESSMENT — PAIN SCALES - GENERAL: PAINLEVEL_OUTOF10: 0

## 2022-04-21 NOTE — DISCHARGE INSTR - COC
Continuity of Care Form    Patient Name: Onel Moore   :  1970  MRN:  [id number]    Admit date:  2022  Discharge date:  ***    Code Status Order: Full Code   Advance Directives:      Admitting Physician:  Jen Lora MD  PCP: Parisa Kaur MD    Discharging Nurse: [facility] Unit/Room#: K695/E205-31  Discharging Unit Phone Number: ***    Emergency Contact:   Extended Emergency Contact Information  Primary Emergency Contact: Shiela Palomares Phone: [phone]  Relation: Brother/Sister  Preferred language: English   needed? No  Secondary Emergency Contact: [id number]  [facility] [place] Phone: [phone]  Relation: Other    Past Surgical History:  Past Surgical History:   Procedure Laterality Date    CORONARY ANGIOPLASTY WITH STENT PLACEMENT      MOUTH SURGERY         Immunization History: There is no immunization history on file for this patient.     Active Problems:  Patient Active Problem List   Diagnosis Code    Facial abscess L02.01    [facility]ST [facility] I21.3       Isolation/Infection:   Isolation            No Isolation          Patient Infection Status       None to display            Nurse Assessment:  Last Vital Signs: BP (!) 143/97   Pulse 73   Temp 99 °F (37.2 °C) (Oral)   Resp 16   Ht 5' 10\" (1.778 m)   Wt 156 lb 3.2 oz (70.9 kg)   SpO2 99%   BMI 22.41 kg/m²     Last documented pain score (0-10 scale): Pain Level: 0  Last Weight:   Wt Readings from Last 1 Encounters:   22 156 lb 3.2 oz (70.9 kg)     Mental Status:  {IP PT MENTAL STATUS:}    IV Access:  { PRABHU IV ACCESS:234885887}    Nursing Mobility/ADLs:  Walking   {CHP DME RXPW:746279481}  Transfer  {CHP DME SHKO:789233575}  Bathing  {CHP DME YHNU:251797597}  Dressing  {CHP DME GKHO:388702370}  Toileting  {CHP DME ICUY:159850108}  Feeding  {CHP DME MELF:282266189}  Med Admin  {P DME SHYV:051738552}  Med Delivery   { PRABHU MED Delivery:379042583}    Wound Care Documentation and Therapy:  Puncture 22 Wrist Anterior;Right (Active)   Wound Assessment Clean;Dry; Intact 22 0800   Dressing/Treatment Open to air 22 0800   Number of days: 1       Wound 22 Ankle Anterior;Right Eczema rash (Active)   Dressing/Treatment Open to air 22 0730   Number of days: 1        Elimination:  Continence: Bowel: {YES / BW:01632}  Bladder: {YES / IB:21729}  Urinary Catheter: {Urinary Catheter:829080907}   Colostomy/Ileostomy/Ileal Conduit: {YES / ZS:02159}       Date of Last BM: ***  No intake or output data in the 24 hours ending 22 1818  I/O last 3 completed shifts:   In: 600 [P.O.:600]  Out: 1250 [Urine:1250]    Safety Concerns:     [employer] Safety Concerns:444692414}    Impairments/Disabilities:      [employer] Impairments/Disabilities:587870125}    Nutrition Therapy:  Current Nutrition Therapy:   [employer] Diet List:502509723}    Routes of Feeding: {CHP DME Other Feedings:303086534}  Liquids: {Slp liquid thickness:36856}  Daily Fluid Restriction: {CHP DME Yes amt example:400576493}  Last Modified Barium Swallow with Video (Video Swallowing Test): {Done Not Done RXVB:920815770}    Treatments at the Time of Hospital Discharge:   Respiratory Treatments: ***  Oxygen Therapy:  {Therapy; copd oxygen:26309}  Ventilator:    { CC Vent DTVT:214389787}    Rehab Therapies: {THERAPEUTIC INTERVENTION:0975343166}  Weight Bearing Status/Restrictions: [address] [employer] [place] [employer]}  Other Medical Equipment (for information only, NOT a DME order):  {EQUIPMENT:089652620}  Other Treatments: ***    Patient's personal belongings (please select all that are sent with patient):  {P DME Belongings:261863439}    RN SIGNATURE:  {Esignature:155127590}    CASE MANAGEMENT/SOCIAL WORK SECTION    Inpatient Status Date: ***    Readmission Risk Assessment Score:  Readmission Risk              Risk of Unplanned Readmission:  11           Discharging to Facility/ Agency   Name: Address:  Phone:  Fax:    Dialysis Facility (if applicable)   Name:  Address:  Dialysis Schedule:  Phone:  Fax:    / signature: {Esignature:329870346}    PHYSICIAN SECTION    Prognosis: {Prognosis:7599916772}    Condition at Discharge: Rakesh Alford Patient Condition:840308657}    Rehab Potential (if transferring to Rehab): {Prognosis:2216415713}    Recommended Labs or Other Treatments After Discharge: ***    Physician Certification: I certify the above information and transfer of Rosales Kasper  is necessary for the continuing treatment of the diagnosis listed and that he requires {Admit to Appropriate Level of Care:04937} for {GREATER/LESS:155037822} 30 days.      Update Admission H&P: {CHP DME Changes in MVCGQ:161840998}    PHYSICIAN SIGNATURE:  {Esignature:533846513}

## 2022-04-21 NOTE — DISCHARGE SUMMARY
Cardiology Discharge Summary      Patient Identification:  Rosales Kasper  : 1970  MRN: [id number]   Account: [de-identified]     Admit date: 2022  Discharge date: 22  Attending provider: Dale Nichols MD        Primary care provider: Mindi Barros MD     Admission Diagnoses:  STEMI (ST elevation myocardial infarction) [facility])         Discharge Diagnoses: Active Hospital Problems    Diagnosis Date Noted    STEMI (ST elevation myocardial infarction) ([address] [I21.3] 2022     Priority: [facility] Course:   51-year-old male history of CAD status post PCI to the circumflex in 2017, hypertension, hyperlipidemia, active smoker who came to the ER for worsening chest pain  Patient reports that yesterday started complaining of chest pain sharp radiating to the left chest into the back intermittently. This evening patient had another episode of chest pain which he described similar to the chest pain he had when he underwent PCI.     In the ER   EKG showing sinus rhythm with deep T wave inversions in the lateral leads  Given the patient was still having chest pain patient was taken emergently to the Cath Lab  Patient was found with 99% stenosis of the mid LAD status post successful PCI with 2 stents placed (4 x 12 mm and 3.5 x 12 mm)  Patient tolerated the procedure well without complications and was transferred to ICU hemodynamically stable  =======================  Hospital course     2022: Patient is monitored on telemetry and currently in sinus rhythm with a heart rate in the 70s. Of note he did have 2 episodes of nonsustained VT 1 lasting 9 beats and the other lasting 6 beats. Ejection fraction was 20%.   Per Dr. Rosy Messina, we will plan to get patient set up with a LifeVest prior to discharge.     2022  Patient laying in bed comfortably  Denies chest pain  Yesterday patient underwent successful PCI of the mid LAD after coming into the hospital as a STEMI  Echocardiogram showing severe LV dysfunction EF 20%       Procedures:   4/19/2022 patient underwent emergent coronary angiogram secondary to STEMI  Patient underwent PCI to the mid LAD     Consults:   none    Examination:  BP (!) 143/97   Pulse 73   Temp 99 °F (37.2 °C) (Oral)   Resp 16   Ht 5' 10\" (1.778 m)   Wt 156 lb 3.2 oz (70.9 kg)   SpO2 99%   BMI 22.41 kg/m²    Physical Exam  Vitals and nursing note reviewed. Constitutional:       Appearance: Normal appearance. HENT:      Head: Normocephalic and atraumatic. Mouth/Throat:      Mouth: Mucous membranes are moist.      Pharynx: Oropharynx is clear. Eyes:      Extraocular Movements: Extraocular movements intact. Conjunctiva/sclera: Conjunctivae normal.      Pupils: Pupils are equal, round, and reactive to light. Cardiovascular:      Rate and Rhythm: Normal rate and regular rhythm. Pulses: Normal pulses. Heart sounds: Normal heart sounds. Pulmonary:      Effort: Pulmonary effort is normal.      Breath sounds: Normal breath sounds. Abdominal:      General: Abdomen is flat. Bowel sounds are normal.      Palpations: Abdomen is soft. Musculoskeletal:         General: Normal range of motion. Cervical back: Normal range of motion and neck supple. Skin:     General: Skin is warm. Neurological:      General: No focal deficit present. Mental Status: He is alert and oriented to person, place, and time. Mental status is at baseline.    Psychiatric:         Mood and Affect: Mood normal.         Medications:  Current Discharge Medication List      START taking these medications    Details   aspirin 81 MG chewable tablet Take 1 tablet by mouth daily  Qty: 30 tablet, Refills: 3      atorvastatin (LIPITOR) 80 MG tablet Take 1 tablet by mouth nightly  Qty: 30 tablet, Refills: 3      spironolactone (ALDACTONE) 25 MG tablet Take 1 tablet by mouth daily  Qty: 30 tablet, Refills: 3      ticagrelor (BRILINTA) 90 MG TABS tablet Take 1 tablet by mouth 2 times daily  Qty: 60 tablet, Refills: 11      furosemide (LASIX) 20 MG tablet Take 1 tablet by mouth daily  Qty: 60 tablet, Refills: 3      carvedilol (COREG) 12.5 MG tablet Take 1 tablet by mouth 2 times daily (with meals)  Qty: 60 tablet, Refills: 3      sacubitril-valsartan (ENTRESTO) 24-26 MG per tablet Take 1 tablet by mouth 2 times daily  Qty: 60 tablet, Refills: 3      nitroGLYCERIN (NITROSTAT) 0.4 MG SL tablet Place 1 tablet under the tongue every 5 minutes as needed for Chest pain (x maximum of 3 doses. If CP unrelieved after 3 doses, call 911)  Qty: 25 tablet, Refills: 1             Significant Diagnostics:   Radiology: Echocardiogram complete 2D with doppler with color    Result Date: 4/20/2022  Transthoracic Echocardiography Report (TTE)  Demographics   Patient Name    Tiffanie NIXON  Gender               Male                  V   Patient Number  [id number]        Race                 Black                                   Ethnicity   Visit Number    [id number]       Room Number          IC10   Corporate ID                    Date of Study        04/20/2022   Accession       [id number]      Referring Physician  Number   Date of Birth   1970      Sonographer          Gordy Swan   Age             50 year(s)      Interpreting         [facility])                                  Physician            Cardiology                                                       Teresita Romano MD  Procedure Type of Study   TTE procedure:ECHO COMPLETE 2D W/DOP W/COLOR. Procedure Date Date: 04/20/2022 Start: 09:18 AM Study Location: Portable Technical Quality: Adequate visualization Indications:Congestive heart failure. Patient Status: Routine Height: 70 inches Weight: 160 pounds BSA: 1.9 m^2 BMI: 22.96 kg/m^2 BP: 106/83 mmHg  Conclusions   Summary  Normal mitral valve structure and function. 1+ MR  Left ventricular ejection fraction is visually estimated at 20%. Global Hypokinesis. Mild [facility]  Restrictive filling pattern. Signature   ----------------------------------------------------------------  Electronically signed by Jason Momin MD(Interpreting  physician) on 04/20/2022 09:56 AM  ----------------------------------------------------------------   Findings  Left Ventricle Left ventricular ejection fraction is visually estimated at 20%. Global Hypokinesis. Mild CLVH Restrictive filling pattern. Right Ventricle Normal right ventricle structure and function. Normal right ventricle systolic pressure. Left Atrium Normal left atrium. Right Atrium Normal right atrium. Mitral Valve Normal mitral valve structure and function. 1+ MR Tricuspid Valve Normal tricuspid valve structure and function. Aortic Valve Normal aortic valve structure and function. Pulmonic Valve Normal pulmonic valve structure and function. Pericardial Effusion No evidence of pericardial effusion. Pleural Effusion No evidence of pleural effusion. Aorta \ Miscellaneous The aorta is within normal limits. M-Mode Measurements (cm)   LVIDd: 5.15 cm                         LVIDs: 4.76 cm  IVSd: 1.34 cm                          IVSs: 1.4 cm  LVPWd: 0.58 cm                         AO Root Dimension: 3.02 cm  Rt. Vent.  Dimension: 2.49 cm                                         LVOT: 2.03 cm  Doppler Measurements:   AV Velocity:0.02 m/s                   MV Peak E-Wave: 0.86 m/s  AV Peak Gradient: 7.78 mmHg            MV Peak A-Wave: 0.21 m/s  AV Mean Gradient: 4.38 mmHg  AV Area (Continuity):1.99 cm^2  Valves  Mitral Valve   Peak E-Wave: 0.86 m/s          Peak A-Wave: 0.21 m/s                                 E/A Ratio: 4.15                                 Peak Gradient: 2.93 mmHg  MR Velocity: 3.78 m/s          Deceleration Time: 192.4 msec   Aortic Valve   Peak Velocity: 1.39 m/s                Mean Velocity: 0.98 m/s  Peak Gradient: 7.78 mmHg               Mean Gradient: 4.38 mmHg  Area (continuity): 1.99 cm^2  AV VTI: 26.53 cm   Pulmonic Valve   Peak Velocity: 1.09 m/s             Peak Gradient: 4.75 mmHg   LVOT   Peak Velocity: 1.14 m/s              Mean Velocity: 0.78 m/s  Peak Gradient: 5.21 mmHg             Mean Gradient: 2.86 mmHg  LVOT Diameter: 2.03 cm               LVOT VTI: 16.3 cm  Structures  Left Atrium   LA Volume/Index: 49.57 ml /26 m^2             LA Area: 15.22 cm^2   Left Ventricle   Diastolic Dimension: 9.04 cm         Systolic Dimension: 7.67 cm  Septum Diastolic: 8.01 cm            Septum Systolic: 1.4 cm  PW Diastolic: 5.22 cm                                       FS: 7.6 %  LV EDV/LV EDV Index: 126.45 ml/67    LV ESV/LV ESV Index: 105.47 ml/56 m^2  m^2  EF Calculated: 16.6 %   LVOT Diameter: 2.03 cm   Right Ventricle   Diastolic Dimension: 2.16 cm  Aorta/ Miscellaneous Aorta   Aortic Root: 3.02 cm  LVOT Diameter: 2.03 cm      XR CHEST PORTABLE    Result Date: 4/20/2022  EXAMINATION: CHEST PORTABLE VIEW  CLINICAL HISTORY: Midsternal chest pain, code purple COMPARISONS: None  FINDINGS: Single  views of the chest is submitted. The cardiac silhouette is enlarged Pulmonary vascular unremarkable. Right sided trachea. No focal infiltrates. No Pneumothoraces.                                                                                    NO ACUTE ACTIVE CARDIOPULMONARY PROCESS      Labs:   Recent Results (from the past 72 hour(s))   EKG 12 Lead    Collection Time: 04/19/22  9:38 PM   Result Value Ref Range    Ventricular Rate 60 BPM    Atrial Rate 60 BPM    P-R Interval 166 ms    QRS Duration 100 ms    Q-T Interval 478 ms    QTc Calculation (Bazett) 478 ms    P Axis 16 degrees    R Axis -22 degrees    T Axis 131 degrees   POCT Glucose    Collection Time: 04/19/22  9:45 PM   Result Value Ref Range    POC Glucose 120 (H) 70 - 99 mg/dl    Performed on ACCU-CHEK    Protime-INR    Collection Time: 04/19/22  9:54 PM   Result Value Ref Range    Protime 13.1 12.3 - 14.9 sec    INR 1.0    APTT    Collection Time: 04/19/22  9:54 PM   Result Value Ref Range    aPTT 29.4 24.4 - 36.8 sec   Comprehensive Metabolic Panel    Collection Time: 04/19/22  9:54 PM   Result Value Ref Range    Sodium 137 135 - 144 mEq/L    Potassium 3.4 3.4 - 4.9 mEq/L    Chloride 101 95 - 107 mEq/L    CO2 26 20 - 31 mEq/L    Anion Gap 10 9 - 15 mEq/L    Glucose 132 (H) 70 - 99 mg/dL    BUN 9 6 - 20 mg/dL    CREATININE 1.02 0.70 - 1.20 mg/dL    GFR Non-African American >60.0 >60    GFR  >60.0 >60    Calcium 9.5 8.5 - 9.9 mg/dL    Total Protein 7.3 6.3 - 8.0 g/dL    Albumin 4.2 3.5 - 4.6 g/dL    Total Bilirubin 0.5 0.2 - 0.7 mg/dL    Alkaline Phosphatase 107 (H) 35 - 104 U/L    ALT 18 0 - 41 U/L    AST 29 0 - 40 U/L    Globulin 3.1 2.3 - 3.5 g/dL   Magnesium    Collection Time: 04/19/22  9:54 PM   Result Value Ref Range    Magnesium 2.0 1.7 - 2.4 mg/dL   Troponin    Collection Time: 04/19/22  9:54 PM   Result Value Ref Range    Troponin 0.426 (HH) 0.000 - 0.010 ng/mL   Brain Natriuretic Peptide    Collection Time: 04/19/22  9:54 PM   Result Value Ref Range    Pro-BNP 3,503 pg/mL   CK    Collection Time: 04/19/22  9:54 PM   Result Value Ref Range    Total  (H) 0 - 190 U/L   TSH with Reflex    Collection Time: 04/19/22  9:54 PM   Result Value Ref Range    TSH 0.913 0.440 - 3.860 uIU/mL   CBC with Auto Differential    Collection Time: 04/19/22  9:55 PM   Result Value Ref Range    WBC 7.5 4.8 - 10.8 K/uL    RBC 4.41 (L) 4.70 - 6.10 M/uL    Hemoglobin 13.8 (L) 14.0 - 18.0 g/dL    Hematocrit 41.1 (L) 42.0 - 52.0 %    MCV 93.2 80.0 - 100.0 fL    MCH 31.4 (H) 27.0 - 31.3 pg    MCHC 33.7 33.0 - 37.0 %    RDW 14.8 (H) 11.5 - 14.5 %    Platelets 278 587 - 092 K/uL    Neutrophils % 58.3 %    Lymphocytes % 24.7 %    Monocytes % 10.7 %    Eosinophils % 5.3 %    Basophils % 1.0 %    Neutrophils Absolute 4.4 1.4 - 6.5 K/uL    Lymphocytes Absolute 1.8 1.0 - 4.8 K/uL    Monocytes Absolute 0.8 0.2 - 0.8 K/uL    Eosinophils Absolute 0.4 0.0 - 0.7 K/uL Basophils Absolute 0.1 0.0 - 0.2 K/uL   TYPE AND SCREEN    Collection Time: 04/19/22 10:00 PM   Result Value Ref Range    ABO/Rh A POS     Antibody Screen NEG    Lactic Acid    Collection Time: 04/19/22 10:00 PM   Result Value Ref Range    Lactic Acid 0.9 0.5 - 2.2 mmol/L   POCT Glucose    Collection Time: 04/19/22 10:00 PM   Result Value Ref Range    Glucose 120 mg/dL    QC OK?  yes    Ethanol    Collection Time: 04/19/22 10:00 PM   Result Value Ref Range    Ethanol Lvl <10 mg/dL    Ethanol percent Not indicated G/dL   POCT Arterial    Collection Time: 04/19/22 10:51 PM   Result Value Ref Range    ACT-K 368 (H) 82 - 152 sec    Sample Type ART     Performed on SEE BELOW    EKG 12 lead    Collection Time: 04/20/22 12:11 AM   Result Value Ref Range    Ventricular Rate 52 BPM    Atrial Rate 52 BPM    P-R Interval 188 ms    QRS Duration 104 ms    Q-T Interval 514 ms    QTc Calculation (Bazett) 478 ms    P Axis 37 degrees    R Axis 79 degrees    T Axis 149 degrees   Anti-Xa, Unfractionated Heparin    Collection Time: 04/20/22  5:04 AM   Result Value Ref Range    Anti-XA Unfrac Heparin <0.10 IU/mL   CBC    Collection Time: 04/20/22  5:32 AM   Result Value Ref Range    WBC 6.5 4.8 - 10.8 K/uL    RBC 4.68 (L) 4.70 - 6.10 M/uL    Hemoglobin 14.7 14.0 - 18.0 g/dL    Hematocrit 44.0 42.0 - 52.0 %    MCV 94.0 80.0 - 100.0 fL    MCH 31.5 (H) 27.0 - 31.3 pg    MCHC 33.5 33.0 - 37.0 %    RDW 14.7 (H) 11.5 - 14.5 %    Platelets 883 054 - 852 K/uL   Basic Metabolic Panel w/ Reflex to MG    Collection Time: 04/20/22  5:33 AM   Result Value Ref Range    Sodium 139 135 - 144 mEq/L    Potassium reflex Magnesium 3.3 (L) 3.4 - 4.9 mEq/L    Chloride 96 95 - 107 mEq/L    CO2 25 20 - 31 mEq/L    Anion Gap 18 (H) 9 - 15 mEq/L    Glucose 86 70 - 99 mg/dL    BUN 9 6 - 20 mg/dL    CREATININE 1.09 0.70 - 1.20 mg/dL    GFR Non-African American >60.0 >60    GFR  >60.0 >60    Calcium 9.0 8.5 - 9.9 mg/dL   Magnesium    Collection Time: 04/20/22  5:33 AM   Result Value Ref Range    Magnesium 2.0 1.7 - 2.4 mg/dL   Hemoglobin A1C    Collection Time: 04/20/22  6:00 AM   Result Value Ref Range    Hemoglobin A1C 5.0 4.8 - 5.9 %   Ethanol    Collection Time: 04/20/22  9:38 AM   Result Value Ref Range    Ethanol Lvl <10 mg/dL    Ethanol percent Not indicated G/dL   Anti-Xa, Unfractionated Heparin    Collection Time: 04/20/22  9:38 AM   Result Value Ref Range    Anti-XA Unfrac Heparin 0.28 IU/mL   EKG 12 lead    Collection Time: 04/20/22  9:42 AM   Result Value Ref Range    Ventricular Rate 71 BPM    Atrial Rate 67 BPM    QRS Duration 150 ms    Q-T Interval 510 ms    QTc Calculation (Bazett) 554 ms    P Axis 36 degrees    R Axis 1 degrees    T Axis 154 degrees   CBC    Collection Time: 04/21/22  6:09 AM   Result Value Ref Range    WBC 6.5 4.8 - 10.8 K/uL    RBC 4.66 (L) 4.70 - 6.10 M/uL    Hemoglobin 14.5 14.0 - 18.0 g/dL    Hematocrit 44.0 42.0 - 52.0 %    MCV 94.5 80.0 - 100.0 fL    MCH 31.1 27.0 - 31.3 pg    MCHC 32.9 (L) 33.0 - 37.0 %    RDW 14.2 11.5 - 14.5 %    Platelets 355 144 - 729 K/uL   Basic Metabolic Panel w/ Reflex to MG    Collection Time: 04/21/22  6:09 AM   Result Value Ref Range    Sodium 136 135 - 144 mEq/L    Potassium reflex Magnesium 3.8 3.4 - 4.9 mEq/L    Chloride 98 95 - 107 mEq/L    CO2 24 20 - 31 mEq/L    Anion Gap 14 9 - 15 mEq/L    Glucose 84 70 - 99 mg/dL    BUN 9 6 - 20 mg/dL    CREATININE 1.10 0.70 - 1.20 mg/dL    GFR Non-African American >60.0 >60    GFR  >60.0 >60    Calcium 9.3 8.5 - 9.9 mg/dL   Lipid Panel    Collection Time: 04/21/22  6:09 AM   Result Value Ref Range    Cholesterol, Total 179 0 - 199 mg/dL    Triglycerides 71 0 - 150 mg/dL    HDL 69 (H) 40 - 59 mg/dL    LDL Calculated 96 0 - 129 mg/dL          normal EKG, normal sinus rhythm    Follow-up visits:   FLAKITO Del Rosario  [address]  Yahaira Griffith [address] [facility] [id number] [phone]    On 4/27/2022  [facility] CHF clinic follow-up on Wed 4/27/22 at 10am in [address] [facility], [address] [place] [address]  [address]  [phone]    Schedule an appointment as soon as possible for a visit in 2 weeks  Call to schedule [address] [facility] Cardiology follow-up    Ross Weir MD  [address] [address] [facility]  [phone]    Schedule an appointment as soon as possible for a visit in 1 week      Yana Mcintyre MD  [address] [facility]  [address] [facility] [phone]    In 2 weeks  Hospital follow-up       Assessment:  Active Hospital Problems    Diagnosis Date Noted    STEMI (ST elevation myocardial infarction) ([facility] [I21.3] 04/19/2022     Priority: High         Plan:   Continue cardiomyopathy medications  Patient was arranged to have a LifeVest which he should be wearing for at least 3 months until echo is repeated and if EF is at 35% or above patient may return the LifeVest  Follow-up in clinic with me in 2 weeks  Cardiac rehab  Continue ticagrelor ideally for 1 year        Electronically signed by [place] 4/21/2022 at 6:14 PM

## 2022-04-21 NOTE — CARE COORDINATION
Ordered placed for lifevest. Orders/clinical faxed to surinder. Spoke with Janiya York and they are reviewing and can possibly still  Discharge today if they receive approval. Cath note faxed. CM following. Still awaiting auth for lifevest. brilinta and entresto discount cards placed on chart. Nursing updated. Call from Janiya York at [facility], it has been approved and the rep will be out around 645 pm to fit the patient. Nursing updated    Met with the patient. The patient denies any other home going needs including [facility]hc needs. Patient to d/c home tonight once lifevest has been fitted/placed. Nursing updated.

## 2022-04-21 NOTE — PROGRESS NOTES
1800: Pt has discharge orders in. Pt to go home with a life vest. A lady from [place] [address] is coming around [address] to fit pt for life vest and explain to him how it works.  Pt ok to go home afterwards    1814: Brilinta and Entresto discount cards are placed in pts chart currently and will be discharged home with pt.    1910: John Hernandez at bedside to go over life vest with pt

## 2022-04-21 NOTE — PROGRESS NOTES
DEPARTMENT OF CARDIOLOGY  Progress note    PATIENT NAME:  Onel Moore    MRN:  [id number]  SERVICE DATE:  4/21/2022   SERVICE TIME:  4:29 PM    Primary Care Physician: Parisa Kaur MD     SUBJECTIVE  CHIEF COMPLAINT:    STEMI    HPI:    51-year-old male history of CAD status post PCI to the circumflex in 2017, hypertension, hyperlipidemia, active smoker who came to the ER for worsening chest pain  Patient reports that yesterday started complaining of chest pain sharp radiating to the left chest into the back intermittently. This evening patient had another episode of chest pain which he described similar to the chest pain he had when he underwent PCI. In the ER   EKG showing sinus rhythm with deep T wave inversions in the lateral leads  Given the patient was still having chest pain patient was taken emergently to the Cath Lab  Patient was found with 99% stenosis of the mid LAD status post successful PCI with 2 stents placed (4 x 12 mm and 3.5 x 12 mm)  Patient tolerated the procedure well without complications and was transferred to ICU hemodynamically stable  =======================  Hospital course    4/21/2022: Patient is monitored on telemetry and currently in sinus rhythm with a heart rate in the 70s. Of note he did have 2 episodes of nonsustained VT 1 lasting 9 beats and the other lasting 6 beats. Ejection fraction was 20%. Per Dr. Hoa Palmer, we will plan to get patient set up with a LifeVest prior to discharge.     4/20/2022  Patient laying in bed comfortably  Denies chest pain  Yesterday patient underwent successful PCI of the mid LAD after coming into the hospital as a STEMI  Echocardiogram showing severe LV dysfunction EF 20%    PAST MEDICAL HISTORY:    Past Medical History:   Diagnosis Date    Asthma     Hypertension     MI (myocardial infarction) ([facility] [address]      PAST SURGICAL HISTORY:    Past Surgical History:   Procedure Laterality Date    CORONARY ANGIOPLASTY WITH STENT PLACEMENT      MOUTH SURGERY       FAMILY HISTORY:  No family history on file. SOCIAL HISTORY:    Social History     Socioeconomic History    Marital status: Single     Spouse name: Not on file    Number of children: Not on file    Years of education: Not on file    Highest education level: Not on file   Occupational History    Not on file   Tobacco Use    Smoking status: Current Some Day Smoker     Types: Cigarettes    Smokeless tobacco: Never Used   Substance and Sexual Activity    Alcohol use: Yes     Comment: \"a beer today\"    Drug use: Yes     Types: Marijuana Padmini Kales)     Comment: \"Ill be honest, I smoke a blunt\"    Sexual activity: Not on file   Other Topics Concern    Not on file   Social History Narrative    Not on file     Social Determinants of Health     Financial Resource Strain:     Difficulty of Paying Living Expenses: Not on file   Food Insecurity:     Worried About Running Out of Food in the Last Year: Not on file    Nhan of Food in the Last Year: Not on file   Transportation Needs:     Lack of Transportation (Medical): Not on file    Lack of Transportation (Non-Medical):  Not on file   Physical Activity:     Days of Exercise per Week: Not on file    Minutes of Exercise per Session: Not on file   Stress:     Feeling of Stress : Not on file   Social Connections:     Frequency of Communication with Friends and Family: Not on file    Frequency of Social Gatherings with Friends and Family: Not on file    Attends Sikh Services: Not on file    Active Member of Clubs or Organizations: Not on file    Attends Club or Organization Meetings: Not on file    Marital Status: Not on file   Intimate Partner Violence:     Fear of Current or Ex-Partner: Not on file    Emotionally Abused: Not on file    Physically Abused: Not on file    Sexually Abused: Not on file   Housing Stability:     Unable to Pay for Housing in the Last Year: Not on file    Number of [place] in the Last Year: Not on file  Unstable Housing in the Last Year: Not on file     MEDICATIONS:   Prior to Admission medications    Medication Sig Start Date End Date Taking? Authorizing Provider   aspirin 81 MG chewable tablet Take 1 tablet by mouth daily 4/22/22  Yes FLAKITO Rosen   atorvastatin (LIPITOR) 80 MG tablet Take 1 tablet by mouth nightly 4/21/22  Yes Vi Rosen   spironolactone (ALDACTONE) 25 MG tablet Take 1 tablet by mouth daily 4/22/22  Yes FLAKITO Rosen   ticagrelor [facility]) 90 MG TABS tablet Take 1 tablet by mouth 2 times daily 4/21/22  Yes FLAKITO Rosen   furosemide (LASIX) 20 MG tablet Take 1 tablet by mouth daily 4/22/22  Yes FLAKITO Rosen   carvedilol (COREG) 12.5 MG tablet Take 1 tablet by mouth 2 times daily (with meals) 4/21/22  Yes FLAKITO Rosen   sacubitril-valsartan (ENTRESTO) 24-26 MG per tablet Take 1 tablet by mouth 2 times daily 4/23/22  Yes FLAKITO Rosen   nitroGLYCERIN (NITROSTAT) 0.4 MG SL tablet Place 1 tablet under the tongue every 5 minutes as needed for Chest pain (x maximum of 3 doses. If CP unrelieved after 3 doses, call 911) 4/21/22  Yes FLAKITO Rosen       ALLERGIES: Patient has no known allergies. REVIEW OF SYSTEM:   Review of Systems   Constitutional: Negative for activity change, chills, diaphoresis and fever. HENT: Negative for congestion, ear pain, nosebleeds and rhinorrhea. Eyes: Negative for redness and visual disturbance. Respiratory: Positive for chest tightness. Negative for apnea, cough, shortness of breath and wheezing. Cardiovascular: Negative for chest pain, palpitations and leg swelling. Gastrointestinal: Negative for abdominal pain, constipation, diarrhea, nausea and vomiting. Genitourinary: Negative for difficulty urinating and dysuria. Musculoskeletal: Negative. Negative for joint swelling. Skin: Negative for color change, rash and wound.    Neurological: Negative for dizziness, syncope, weakness, numbness and headaches. Psychiatric/Behavioral: Negative. OBJECTIVE  PHYSICAL EXAM:   Physical Exam  Vitals and nursing note reviewed. Constitutional:       Appearance: Normal appearance. HENT:      Head: Normocephalic and atraumatic. Mouth/Throat:      Mouth: Mucous membranes are moist.      Pharynx: Oropharynx is clear. Eyes:      Extraocular Movements: Extraocular movements intact. Conjunctiva/sclera: Conjunctivae normal.      Pupils: Pupils are equal, round, and reactive to light. Cardiovascular:      Rate and Rhythm: Normal rate and regular rhythm. Pulses: Normal pulses. Heart sounds: Normal heart sounds. Pulmonary:      Effort: Pulmonary effort is normal.      Breath sounds: Normal breath sounds. Abdominal:      General: Abdomen is flat. Bowel sounds are normal.      Palpations: Abdomen is soft. Musculoskeletal:         General: Normal range of motion. Cervical back: Normal range of motion and neck supple. Skin:     General: Skin is warm. Neurological:      General: No focal deficit present. Mental Status: He is alert and oriented to person, place, and time. Mental status is at baseline. Psychiatric:         Mood and Affect: Mood normal.          BP (!) 143/97   Pulse 73   Temp 99 °F (37.2 °C) (Oral)   Resp 16   Ht 5' 10\" (1.778 m)   Wt 156 lb 3.2 oz (70.9 kg)   SpO2 99%   BMI 22.41 kg/m²     DATA:     Diagnostic tests reviewed for today's visit:    Most recent labs and imaging results reviewed.      LABS:    Recent Results (from the past 24 hour(s))   CBC    Collection Time: 04/21/22  6:09 AM   Result Value Ref Range    WBC 6.5 4.8 - 10.8 K/uL    RBC 4.66 (L) 4.70 - 6.10 M/uL    Hemoglobin 14.5 14.0 - 18.0 g/dL    Hematocrit 44.0 42.0 - 52.0 %    MCV 94.5 80.0 - 100.0 fL    MCH 31.1 27.0 - 31.3 pg    MCHC 32.9 (L) 33.0 - 37.0 %    RDW 14.2 11.5 - 14.5 %    Platelets 926 148 - 913 K/uL   Basic Metabolic Panel w/ Reflex to MG Collection Time: 04/21/22  6:09 AM   Result Value Ref Range    Sodium 136 135 - 144 mEq/L    Potassium reflex Magnesium 3.8 3.4 - 4.9 mEq/L    Chloride 98 95 - 107 mEq/L    CO2 24 20 - 31 mEq/L    Anion Gap 14 9 - 15 mEq/L    Glucose 84 70 - 99 mg/dL    BUN 9 6 - 20 mg/dL    CREATININE 1.10 0.70 - 1.20 mg/dL    GFR Non-African American >60.0 >60    GFR  >60.0 >60    Calcium 9.3 8.5 - 9.9 mg/dL   Lipid Panel    Collection Time: 04/21/22  6:09 AM   Result Value Ref Range    Cholesterol, Total 179 0 - 199 mg/dL    Triglycerides 71 0 - 150 mg/dL    HDL 69 (H) 40 - 59 mg/dL    LDL Calculated 96 0 - 129 mg/dL       IMAGING:  No results found. VTE Prophylaxis: unfractionated heparin -  start    ASSESSMENT AND PLAN  STEMI. Culprit lesion mid LAD status post successful PCI placement of 2 drug-eluting stents (4 x 28 mm and 3.5 x 12 mm)  Severe ischemic cardiomyopathy EF 20% by TTE 4/20/2022  History of prior PCI to the circumflex 2017 (stent wide open)  Active smoker  Hypertension  Hyperlipidemia    TTE 4/20/2022 EF 20% with global hypokinesis    Plan:  Continue telemetry. Continue ticagrelor 90 mg p.o. twice daily ideally for 1 year  Continue aspirin and atorvastatin indefinitely for secondary prevention of CAD  Increase Coreg from 3.125 mg p.o. twice daily to 12.5 mg p.o. twice daily  Continue losartan 25 mg daily and uptitrate as tolerated.  Consider starting patient on entresto prior to   Start low-dose Lasix 20 mg daily and spironolactone  Life vest to be set up before discharge      Plan of care discussed with: patient    SIGNATURE: Mary Beth Bruce, APRN - CNP  DATE: April 21, 2022  TIME: 4:29 PM

## 2022-04-22 ENCOUNTER — OFFICE VISIT (OUTPATIENT)
Dept: FAMILY MEDICINE CLINIC | Age: 52
End: 2022-04-22
Payer: MEDICAID

## 2022-04-22 VITALS
OXYGEN SATURATION: 99 % | DIASTOLIC BLOOD PRESSURE: 80 MMHG | RESPIRATION RATE: 16 BRPM | HEIGHT: 70 IN | HEART RATE: 86 BPM | SYSTOLIC BLOOD PRESSURE: 120 MMHG | WEIGHT: 161 LBS | BODY MASS INDEX: 23.05 KG/M2

## 2022-04-22 DIAGNOSIS — Z09 HOSPITAL DISCHARGE FOLLOW-UP: Primary | ICD-10-CM

## 2022-04-22 DIAGNOSIS — R21 SKIN RASH: ICD-10-CM

## 2022-04-22 PROCEDURE — 1111F DSCHRG MED/CURRENT MED MERGE: CPT | Performed by: INTERNAL MEDICINE

## 2022-04-22 PROCEDURE — 99214 OFFICE O/P EST MOD 30 MIN: CPT | Performed by: INTERNAL MEDICINE

## 2022-04-22 SDOH — ECONOMIC STABILITY: FOOD INSECURITY: WITHIN THE PAST 12 MONTHS, THE FOOD YOU BOUGHT JUST DIDN'T LAST AND YOU DIDN'T HAVE MONEY TO GET MORE.: NEVER TRUE

## 2022-04-22 SDOH — ECONOMIC STABILITY: FOOD INSECURITY: WITHIN THE PAST 12 MONTHS, YOU WORRIED THAT YOUR FOOD WOULD RUN OUT BEFORE YOU GOT MONEY TO BUY MORE.: NEVER TRUE

## 2022-04-22 ASSESSMENT — PATIENT HEALTH QUESTIONNAIRE - PHQ9
SUM OF ALL RESPONSES TO PHQ QUESTIONS 1-9: 0
SUM OF ALL RESPONSES TO PHQ9 QUESTIONS 1 & 2: 0
1. LITTLE INTEREST OR PLEASURE IN DOING THINGS: 0
9. THOUGHTS THAT YOU WOULD BE BETTER OFF DEAD, OR OF HURTING YOURSELF: 0
SUM OF ALL RESPONSES TO PHQ QUESTIONS 1-9: 0
3. TROUBLE FALLING OR STAYING ASLEEP: 0
6. FEELING BAD ABOUT YOURSELF - OR THAT YOU ARE A FAILURE OR HAVE LET YOURSELF OR YOUR FAMILY DOWN: 0
8. MOVING OR SPEAKING SO SLOWLY THAT OTHER PEOPLE COULD HAVE NOTICED. OR THE OPPOSITE, BEING SO FIGETY OR RESTLESS THAT YOU HAVE BEEN MOVING AROUND A LOT MORE THAN USUAL: 0
SUM OF ALL RESPONSES TO PHQ QUESTIONS 1-9: 0
5. POOR APPETITE OR OVEREATING: 0
2. FEELING DOWN, DEPRESSED OR HOPELESS: 0
7. TROUBLE CONCENTRATING ON THINGS, SUCH AS READING THE NEWSPAPER OR WATCHING TELEVISION: 0
4. FEELING TIRED OR HAVING LITTLE ENERGY: 0
SUM OF ALL RESPONSES TO PHQ QUESTIONS 1-9: 0
10. IF YOU CHECKED OFF ANY PROBLEMS, HOW DIFFICULT HAVE THESE PROBLEMS MADE IT FOR YOU TO DO YOUR WORK, TAKE CARE OF THINGS AT HOME, OR GET ALONG WITH OTHER PEOPLE: 0

## 2022-04-22 ASSESSMENT — SOCIAL DETERMINANTS OF HEALTH (SDOH): HOW HARD IS IT FOR YOU TO PAY FOR THE VERY BASICS LIKE FOOD, HOUSING, MEDICAL CARE, AND HEATING?: NOT HARD AT ALL

## 2022-04-22 NOTE — PROGRESS NOTES
Post-Discharge Transitional Care  Follow Up      Lane Callahan   YOB: 1970    Date of Office Visit:  4/22/2022  Date of Hospital Admission: 4/19/22  Date of Hospital Discharge: 4/21/22  Risk of hospital readmission (high >=14%. Medium >=10%) :Readmission Risk Score: 7 ( )      Care management risk score Rising risk (score 2-5) and Complex Care (Scores >=6): 0     Non face to face  following discharge, date last encounter closed (first attempt may have been earlier): *No documented post hospital discharge outreach found in the last 14 days    Call initiated 2 business days of discharge: *No response recorded in the last 14 days    ASSESSMENT/PLAN:   Hospital discharge follow-up  -     [address] [place] [address]  Skin rash  -     [facility] - Tamika Peterson MD, Dermatology, [address] [place] [address] Decision Making: moderate complexity  Return in 3 months (on 7/22/2022). On this date 4/22/2022 I have spent 30   minutes reviewing previous notes, test results and face to face with the patient discussing the diagnosis and importance of compliance with the treatment plan as well as documenting on the day of the visit. Subjective:   HPI:  Follow up of Hospital problems/diagnosis(es): myocardial infarction      Inpatient course: Discharge summary reviewed- see chart. Interval history/Current status::The patient underwent a stent to the MID LAD( Everolimus Eluting). Since discharge he has been asymptomatic. He is compliant with aspirin, Lipitor, Brilinta, Coreg and Entresto. His ejection fraction was noted to be 20%. Patient Active Problem List   Diagnosis    Facial abscess    STEMI (ST elevation myocardial infarction) ([facility] [address]       Medications listed as ordered at the time of discharge from hospital     Medication List          Accurate as of April 22, 2022 11:59 PM. If you have any questions, ask your nurse or doctor.             CONTINUE taking these medications    aspirin 81 MG chewable tablet  Take 1 tablet by mouth daily     atorvastatin 80 MG tablet  Commonly known as: LIPITOR  Take 1 tablet by mouth nightly     carvedilol 12.5 MG tablet  Commonly known as: COREG  Take 1 tablet by mouth 2 times daily (with meals)     furosemide 20 MG tablet  Commonly known as: LASIX  Take 1 tablet by mouth daily     nitroGLYCERIN 0.4 MG SL tablet  Commonly known as: Nitrostat  Place 1 tablet under the tongue every 5 minutes as needed for Chest pain (x maximum of 3 doses. If CP unrelieved after 3 doses, call 911)     pantoprazole 40 MG tablet  Commonly known as: PROTONIX  Take 1 tablet by mouth every morning (before breakfast)     sacubitril-valsartan 24-26 MG per tablet  Commonly known as: ENTRESTO  Take 1 tablet by mouth 2 times daily     spironolactone 25 MG tablet  Commonly known as: ALDACTONE  Take 1 tablet by mouth daily     ticagrelor 90 MG Tabs tablet  Commonly known as: BRILINTA  Take 1 tablet by mouth 2 times daily              Medications marked \"taking\" at this time  No outpatient medications have been marked as taking for the 4/22/22 encounter (Office Visit) with Tova Luz MD.        Medications patient taking as of now reconciled against medications ordered at time of hospital discharge: Yes    A comprehensive review of systems was negative except for what was noted in the HPI.     Objective:    /80   Pulse 86   Resp 16   Ht 5' 10\" (1.778 m)   Wt 161 lb (73 kg)   SpO2 99%   BMI 23.10 kg/m²   General Appearance: alert and oriented to person, place and time, well developed and well- nourished, in no acute distress  Skin: warm and dry, no rash or erythema  Head: normocephalic and atraumatic  Eyes: pupils equal, round, and reactive to light, extraocular eye movements intact, conjunctivae normal  ENT: tympanic membrane, external ear and ear canal normal bilaterally, nose without deformity, nasal mucosa and turbinates normal without polyps  Neck: supple and non-tender without mass, no thyromegaly or thyroid nodules, no cervical lymphadenopathy  Pulmonary/Chest: clear to auscultation bilaterally- no wheezes, rales or rhonchi, normal air movement, no respiratory distress  Cardiovascular: normal rate, regular rhythm, normal S1 and S2, no murmurs, rubs, clicks, or gallops, distal pulses intact, no carotid bruits  Abdomen: soft, non-tender, non-distended, normal bowel sounds, no masses or organomegaly  Extremities: no cyanosis, clubbing or edema  Musculoskeletal: normal range of motion, no joint swelling, deformity or tenderness  Neurologic: reflexes normal and symmetric, no cranial nerve deficit, gait, coordination and speech normal      An electronic signature was used to authenticate this note.   --Bk Moralez MD

## 2022-04-22 NOTE — PROGRESS NOTES
Discharge instructions reviewed with patient. IVs removed. All belongings with patient in preparation for discharge including newly fitted life vest. Patient in safe and stable condition.      Christopher Boyer RN

## 2022-05-04 ENCOUNTER — OFFICE VISIT (OUTPATIENT)
Dept: CARDIOLOGY CLINIC | Age: 52
End: 2022-05-04
Payer: MEDICAID

## 2022-05-04 VITALS
RESPIRATION RATE: 18 BRPM | HEART RATE: 64 BPM | DIASTOLIC BLOOD PRESSURE: 84 MMHG | BODY MASS INDEX: 22.76 KG/M2 | WEIGHT: 159 LBS | HEIGHT: 70 IN | OXYGEN SATURATION: 97 % | SYSTOLIC BLOOD PRESSURE: 131 MMHG

## 2022-05-04 DIAGNOSIS — I25.10 CAD S/P PERCUTANEOUS CORONARY ANGIOPLASTY: ICD-10-CM

## 2022-05-04 DIAGNOSIS — E78.5 DYSLIPIDEMIA: ICD-10-CM

## 2022-05-04 DIAGNOSIS — I25.5 ISCHEMIC CARDIOMYOPATHY: ICD-10-CM

## 2022-05-04 DIAGNOSIS — I50.21 ACUTE SYSTOLIC CHF (CONGESTIVE HEART FAILURE), NYHA CLASS 1 (HCC): ICD-10-CM

## 2022-05-04 DIAGNOSIS — I21.02 STEMI INVOLVING LEFT ANTERIOR DESCENDING CORONARY ARTERY (HCC): ICD-10-CM

## 2022-05-04 DIAGNOSIS — I10 HYPERTENSION, UNSPECIFIED TYPE: ICD-10-CM

## 2022-05-04 DIAGNOSIS — Z98.61 CAD S/P PERCUTANEOUS CORONARY ANGIOPLASTY: ICD-10-CM

## 2022-05-04 DIAGNOSIS — Z87.891 FORMER TOBACCO USE: ICD-10-CM

## 2022-05-04 LAB
ANION GAP SERPL CALCULATED.3IONS-SCNC: 13 MEQ/L (ref 9–15)
BUN BLDV-MCNC: 21 MG/DL (ref 6–20)
CALCIUM SERPL-MCNC: 9.7 MG/DL (ref 8.5–9.9)
CHLORIDE BLD-SCNC: 98 MEQ/L (ref 95–107)
CO2: 28 MEQ/L (ref 20–31)
CREAT SERPL-MCNC: 1.27 MG/DL (ref 0.7–1.2)
GFR AFRICAN AMERICAN: >60
GFR NON-AFRICAN AMERICAN: >60
GLUCOSE BLD-MCNC: 106 MG/DL (ref 70–99)
POTASSIUM SERPL-SCNC: 4 MEQ/L (ref 3.4–4.9)
PRO-BNP: 902 PG/ML
SODIUM BLD-SCNC: 139 MEQ/L (ref 135–144)

## 2022-05-04 PROCEDURE — G8427 DOCREV CUR MEDS BY ELIG CLIN: HCPCS | Performed by: PHYSICIAN ASSISTANT

## 2022-05-04 PROCEDURE — 99215 OFFICE O/P EST HI 40 MIN: CPT | Performed by: PHYSICIAN ASSISTANT

## 2022-05-04 PROCEDURE — G8420 CALC BMI NORM PARAMETERS: HCPCS | Performed by: PHYSICIAN ASSISTANT

## 2022-05-04 PROCEDURE — 3017F COLORECTAL CA SCREEN DOC REV: CPT | Performed by: PHYSICIAN ASSISTANT

## 2022-05-04 PROCEDURE — 1111F DSCHRG MED/CURRENT MED MERGE: CPT | Performed by: PHYSICIAN ASSISTANT

## 2022-05-04 PROCEDURE — 1036F TOBACCO NON-USER: CPT | Performed by: PHYSICIAN ASSISTANT

## 2022-05-04 ASSESSMENT — ENCOUNTER SYMPTOMS
COUGH: 0
VOMITING: 0
CHEST TIGHTNESS: 0
BLOOD IN STOOL: 0
NAUSEA: 0
SHORTNESS OF BREATH: 0
COLOR CHANGE: 0
ABDOMINAL DISTENTION: 0

## 2022-05-04 NOTE — PROGRESS NOTES
Patient: Kate Castro  YOB: 1970  MRN: [id number]    Chief Complaint:  Chief Complaint   Patient presents with    Congestive Heart Failure         Subjective/HPI         5/4/22 CHF clinic visit #1: This is a very pleasant 53-year-old -American male with past medical history significant for coronary artery disease status post prior PCI of circumflex in 2017 while living in [place] and most recent PCI drug-eluting stent of the LAD on 4/19/2022 after presenting to [facility] with ST elevation MI, ischemic cardiomyopathy with EF of 98%, acute systolic congestive heart failure, hypertension, dyslipidemia, former tobacco abuse and former daily EtOH who presents for initial CHF clinic evaluation following recent hospital admission. Patient had originally presented to [facility] ER on 4/19/2022 with complaints of intermittent chest pain for 2 days prior to presentation. In ER he was noted to have ST elevation MI on EKG and was taken emergently for cardiac catheterization with Dr. Niki Shafer. Cardiac catheterization on 4/19/2022 showed focal 99% proximal LAD stenosis which patient went PCI with drug-eluting stent, otherwise patient had widely patent stent in the proximal to mid circumflex, mild disease the left main and small nondominant RCA with proximal to mid segment severe stenosis. He was initiated on dual antiplatelet therapy with aspirin and Brilinta and subsequently admitted for evaluation. He underwent echocardiogram on 4/20/2022 which revealed severely reduced LV systolic function with EF 20%, 1+ MR, mild concentric left ventricular hypertrophy, restrictive filling pattern. He was optimized on cardiac and heart failure medications. During his mission he was noted to have multiple episodes of nonsustained ventricular tachycardia and he was fitted for a LifeVest prior to discharge. He was subsequently discharged in stable condition on 4/21/2022.     Since hospital discharge, patient states he is doing well. He has brought with him all of his current medication bottles for review. He is compliant with dual antiplatelet therapy of aspirin and Brilinta as well as all other medications. He states that he has modified his diet significantly and is now air frying most of his foods instead of cooking with oils or grease. He has eliminated use of salt from his diet and currently is using Mrs. Roper. Also he states that he stopped smoking cold turkey on day of discharge from deviously smoking about a quarter of a pack per day x 25 to 30 years. Also states that he has quit drinking when previously was drinking 1 to 2-24 ounce beers per day and also has stopped smoking marijuana which he had done occasionally. He denies shortness of breath or dyspnea on exertion, chest pain, palpitations, diaphoresis, paroxysmal nocturnal dyspnea, orthopnea, lower extremity edema, dizziness, lightheadedness, syncope, fever or chills. He is compliant with LifeVest.    Patient had relocated to the area from [place] in early December and states that he is planning to live here long-term. He is currently living with a friend but is in the process of obtaining his own residence. He does have a 11year-old son who resides in [place] with his mom. He is scheduled to start cardiac rehab on 5/31/2022. Most recent labs reviewed and documented below  BMP 4/21/2022: Sodium 136, potassium 3.8, chloride 98, total CO2 24, BUN 9, creatinine 1.10, GFR greater than 60, glucose 84  CBC 4/21/2022: WBC 6.5, hemoglobin 14.5, hematocrit 44.0, platelets 710  Lipid panel 4/21/2022: Total cholesterol 179, triglycerides 71, HDL 69, LDL 96  Hemoglobin A1c on 4/20/2022: 5.0  Magnesium level on 4/20/2022: 2.0  TSH on 4/19/2022: 0.913  proBNP on 4/19/2022: 3503       Most recent diagnostic testing reviewed and documented below.     EKG 4/20/22: ? SR 71, LBBB, ST depression with T wave inversion leads I, aVL and V4-V6, QTc 554ms    Echocardiogram 4/20/22:  Conclusions   Summary   Normal mitral valve structure and function. 1+ MR   Left ventricular ejection fraction is visually estimated at 20%. Global Hypokinesis. Mild CLVH   Restrictive filling pattern. Signature   ----------------------------------------------------------------   Electronically signed by Armand Garcia MD(Interpreting   physician) on 04/20/2022 09:56 AM   ----------------------------------------------------------------    [facility] 4/19/22:   Angiographic Findings   Cardiac Arteries and Lesion Findings  LMCA: Big size vessel mild disease  LAD: Big sized vessel wraps around the corner proximal focal 99% stenosis  status post PCI as described above    Lesion on Mid LAD: Mid subsection. 99% stenosis reduced to 0%. Pre    procedure GIOVANNA III flow was noted. Post Procedure GIOVANNA III flow was    present. The guidewire cross was successful. Good runoff was present. The    lesion was diagnosed as High Risk (C). Culprit lesion. Devices used    - EUPHORA 2.0X12MM . 1 inflation(s) to a max pressure of: 12 steve. - Abbott BMW Custer I with \"j\" . 014 190 cm. Number of passes: 1.    - Xience Kasia 4mm x 28mm. 1 inflation(s) to a max pressure of: 14    steve. - Resolute McClure 35.mm x 12mm. 1 inflation(s) to a max pressure of: 16    steve. - NC EUPHORA BALLOON 4.00MM X 12MM. 1 inflation(s) to a max pressure    of: 20 steve. LCx: Big size vessel dominant proximal to mid segment stent widely patent  RCA: Small size vessel nondominant proximal to mid segment severe stenosis   Coronary Tree   Dominance: Right   VA  LV function assessed as:Abnormal.  Ejection Fraction    - Method: LV gram. EF%: 40.      Vital signs stable in office today. Blood pressure 131/84, heart rate 64, pulse ox 97% on room air. Weight is 159 pounds.       PMHx:  Recent STEMI on 4/19/22 s/p PCI KARUNA of LAD  ICMP EF 20% per echo 4/20/22  Abnormal EKG  NSVT during admission in 4/2022  HTN  Dyslipidemia  HX PCI to circ in 2017  Right eye blindness  Asthma      PSHx:  Oral surgery  Eye surgery    Social Hx:  Tobacco abuse--Quit on 4/21/22. 1/4ppd x 30 years  Former alcohol--Quit 4/21/22 drank 1-2, 24 oz beer per day  Former marijuana use      Family Hx:  No known CAD      No Known Allergies    Current Outpatient Medications   Medication Sig Dispense Refill    aspirin 81 MG chewable tablet Take 1 tablet by mouth daily 30 tablet 3    atorvastatin (LIPITOR) 80 MG tablet Take 1 tablet by mouth nightly 30 tablet 3    spironolactone (ALDACTONE) 25 MG tablet Take 1 tablet by mouth daily 30 tablet 3    ticagrelor (BRILINTA) 90 MG TABS tablet Take 1 tablet by mouth 2 times daily 60 tablet 11    furosemide (LASIX) 20 MG tablet Take 1 tablet by mouth daily 60 tablet 3    carvedilol (COREG) 12.5 MG tablet Take 1 tablet by mouth 2 times daily (with meals) 60 tablet 3    sacubitril-valsartan (ENTRESTO) 24-26 MG per tablet Take 1 tablet by mouth 2 times daily 60 tablet 3    nitroGLYCERIN (NITROSTAT) 0.4 MG SL tablet Place 1 tablet under the tongue every 5 minutes as needed for Chest pain (x maximum of 3 doses. If CP unrelieved after 3 doses, call 911) 25 tablet 1    pantoprazole (PROTONIX) 40 MG tablet Take 1 tablet by mouth every morning (before breakfast) 30 tablet 3     No current facility-administered medications for this visit.        Past Medical History:   Diagnosis Date    Asthma     Hypertension     MI (myocardial infarction) ([facility] [address]        Past Surgical History:   Procedure Laterality Date    CORONARY ANGIOPLASTY WITH STENT PLACEMENT      MOUTH SURGERY         Social History     Socioeconomic History    Marital status: Single     Spouse name: None    Number of children: None    Years of education: None    Highest education level: None   Occupational History    None   Tobacco Use    Smoking status: Former Smoker     Packs/day: 0.50     Years: 30.00     Pack years: 15.00     Types: Cigarettes     Quit date: 2022     Years since quittin.0    Smokeless tobacco: Never Used   Substance and Sexual Activity    Alcohol use: Yes     Comment: \"a beer today\"    Drug use: Yes     Types: Marijuana Elfida Abner)     Comment: \"Ill be honest, I smoke a blunt\"    Sexual activity: None   Other Topics Concern    None   Social History Narrative    None     Social Determinants of Health     Financial Resource Strain: Low Risk     Difficulty of Paying Living Expenses: Not hard at all   Food Insecurity: No Food Insecurity    Worried About Running Out of Food in the Last Year: Never true    Nhan of Food in the Last Year: Never true   Transportation Needs:     Lack of Transportation (Medical): Not on file    Lack of Transportation (Non-Medical): Not on file   Physical Activity:     Days of Exercise per Week: Not on file    Minutes of Exercise per Session: Not on file   Stress:     Feeling of Stress : Not on file   Social Connections:     Frequency of Communication with Friends and Family: Not on file    Frequency of Social Gatherings with Friends and Family: Not on file    Attends Christianity Services: Not on file    Active Member of [address] [place] [address] or Organizations: Not on file    Attends Club or Organization Meetings: Not on file    Marital Status: Not on file   Intimate Partner Violence:     Fear of Current or Ex-Partner: Not on file    Emotionally Abused: Not on file    Physically Abused: Not on file    Sexually Abused: Not on file   Housing Stability:     Unable to Pay for Housing in the Last Year: Not on file    Number of [place] in the Last Year: Not on file    Unstable Housing in the Last Year: Not on file       No family history on file. Review of Systems:   Review of Systems   Constitutional: Negative for appetite change, chills, fatigue, fever and unexpected weight change. HENT: Negative for congestion. Respiratory: Negative for cough, chest tightness and shortness of breath.     Cardiovascular: Negative for chest pain, palpitations and leg swelling. No orthopnea or PND   Gastrointestinal: Negative for abdominal distention, blood in stool, nausea and vomiting. Genitourinary: Negative for difficulty urinating and hematuria. Musculoskeletal: Negative for arthralgias and myalgias. Skin: Negative for color change, pallor and rash. Neurological: Negative for dizziness, syncope and light-headedness. Psychiatric/Behavioral: Negative for agitation and behavioral problems. Physical Examination:    /84 (Site: Right Upper Arm, Position: Sitting, Cuff Size: Small Adult)   Pulse 64   Resp 18   Ht 5' 10\" (1.778 m)   Wt 159 lb (72.1 kg)   SpO2 97%   BMI 22.81 kg/m²    Physical Exam  Constitutional:       General: He is not in acute distress. Appearance: Normal appearance. HENT:      Head: Normocephalic and atraumatic. Cardiovascular:      Rate and Rhythm: Normal rate and regular rhythm. Pulmonary:      Effort: Pulmonary effort is normal. No respiratory distress. Breath sounds: No wheezing, rhonchi or rales. Abdominal:      Palpations: Abdomen is soft. Tenderness: There is no abdominal tenderness. Musculoskeletal:         General: Normal range of motion. Right lower leg: No edema. Left lower leg: No edema. Skin:     General: Skin is warm and dry. Neurological:      General: No focal deficit present. Mental Status: He is alert and oriented to person, place, and time. Cranial Nerves: No cranial nerve deficit.    Psychiatric:         Mood and Affect: Mood normal.         Behavior: Behavior normal.         LABS:  CBC:   Lab Results   Component Value Date    WBC 6.5 04/21/2022    RBC 4.66 04/21/2022    HGB 14.5 04/21/2022    HCT 44.0 04/21/2022    MCV 94.5 04/21/2022    MCH 31.1 04/21/2022    MCHC 32.9 04/21/2022    RDW 14.2 04/21/2022     04/21/2022     Lipids:  Lab Results   Component Value Date    CHOL 179 04/21/2022     Lab Results Component Value Date    TRIG 71 04/21/2022     Lab Results   Component Value Date    HDL 69 (H) 04/21/2022     Lab Results   Component Value Date    LDLCALC 96 04/21/2022     No results found for: LABVLDL, VLDL  No results found for: CHOLHDLRATIO  CMP:    Lab Results   Component Value Date     04/21/2022    K 3.8 04/21/2022    CL 98 04/21/2022    CO2 24 04/21/2022    BUN 9 04/21/2022    CREATININE 1.10 04/21/2022    GFRAA >60.0 04/21/2022    LABGLOM >60.0 04/21/2022    GLUCOSE 84 04/21/2022    PROT 7.3 04/19/2022    LABALBU 4.2 04/19/2022    CALCIUM 9.3 04/21/2022    BILITOT 0.5 04/19/2022    ALKPHOS 107 04/19/2022    AST 29 04/19/2022    ALT 18 04/19/2022     BMP:    Lab Results   Component Value Date     04/21/2022    K 3.8 04/21/2022    CL 98 04/21/2022    CO2 24 04/21/2022    BUN 9 04/21/2022    LABALBU 4.2 04/19/2022    CREATININE 1.10 04/21/2022    CALCIUM 9.3 04/21/2022    GFRAA >60.0 04/21/2022    LABGLOM >60.0 04/21/2022    GLUCOSE 84 04/21/2022     Magnesium:    Lab Results   Component Value Date    MG 2.0 04/20/2022     TSH:No results found for: TSHFT4, TSH  .result  No results for input(s): PROBNP in the last 72 hours. No results for input(s): INR in the last 72 hours. Patient Active Problem List   Diagnosis    Facial abscess    STEMI (ST elevation myocardial infarction) ([facility] [address]       Assessment/Plan:     Diagnosis Orders   1. Acute systolic CHF (congestive heart failure), NYHA class 1 (HCC)  Basic Metabolic Panel    Brain Natriuretic Peptide    Compensated. Continue current meds   2. Ischemic cardiomyopathy  Basic Metabolic Panel    Brain Natriuretic Peptide    EF 20% per echo 4/20/22   3. CAD S/P percutaneous coronary angioplasty  Basic Metabolic Panel    Hx PCI to circ in 2017. s/p recent PCI of LAD on 4/19/22. On DAPT with aspirin and Brilinta   4. STEMI involving left anterior descending coronary artery [facility])      Recent STEMI s/p PCI KARUNA of LAD on 4/19/22   5. Hypertension, unspecified type      stable. Continue current meds   6. Dyslipidemia      Continue Lipitor 80mg PO daily   7. Former tobacco use      Quit smoking on 4/21/22       -Maximize medical therapy--dual antiplatelet therapy with aspirin 81 mg p.o. daily and Brilinta 90 mg p.o. twice daily, Coreg 12.5 mg p.o. twice daily, Aldactone 25 mg p.o. daily, Lasix 20 mg p.o. daily, Entresto 24/26 mg p.o. twice daily, Lipitor 80 mg p.o. daily, sublingual nitroglycerin as needed for chest pain  -No sign of decompensated heart failure. Patient is euvolemic.  -Check BMP to reevaluate renal function and electrolytes  -Check BNP to reevaluate fluid status  -Cardiac/<2 gram sodium diet recommended  -Recommend 1500mL fluid restriction   -Continue to abstain from tobacco and EtOH  -Instructed patient to weigh self daily every morning upon waking and keep log book of daily weights. Notify office if gaining more than 3 pounds in 48 hours. --Scale provided  -Recommend routine blood pressure monitoring at home.   Advised patient to check blood pressure twice daily in a.m. and p.m. prior to taking medications and record log of blood pressure trends to review at next office visit--BP machine provided  Notify office if BP running high with  mmHg or above or DBP 85 mmHg or above  Notify office if BP running low with  mmHg or below prior to taking medications  -Advised patient to notify office immediately if experiencing any progressive SOB, orthopnea, PND, LE edema or weight gain  -Educated patient on importance of fluid and salt restriction as well as lifestyle modification  -Follow-up with cardiac rehab as scheduled on 5/31/2022  -Maintain routine outpatient follow-up with general cardiologist, Dr. Johanna Neal scheduled for 5/18/2022  **Plan to reevaluate LV function in 3 months likely with repeat echocardiogram (around July 20, 2022) and if EF remains severely reduced at less than or equal to 35%, will need referral to electrophysiology for AICD  -Advised to notify cardiology immediately with any questions, concerns or changes in his condition  -Maintain routine outpatient follow-up with PCP, Dr. Sandee Baxter  -F/U with CHF clinic in 4 weeks or sooner if needed          Counseling: The importance of daily weights, dietary sodium restriction, and contact with cardiology if weight is increased more than 3 lbs in any 48 hour period was stressed. The patient has been advised to contact us if theyexperience progressive SOB, orthopnea, PND or progressive edema.       > 40 minutes spent on chart review and face-to-face time with patient reviewing history, reviewing recent diagnostic testing, providing patient education and discussing ongoing plan of care

## 2022-05-04 NOTE — PATIENT INSTRUCTIONS
-Check labs (BMP and BNP) today    -Keep appointment with cardiac rehab scheduled for 5/31/22        -Check daily weight every morning and notify CHF clinic if gaining more than 3 pounds in 2 days    -Check blood pressure twice daily in a.m. and p.m. prior to taking medications and keep log of blood pressure trends to review at next office visit  Notify office if BP running low with  mmHg or below prior to taking medications  Notify office if BP running high with  mmHg or above or if DBP 85 mmHg or above    -Recommend 2000 mg daily sodium restriction    -Recommend 1.5 liter (48ounces) daily fluid restriction

## 2022-05-15 ENCOUNTER — HOSPITAL ENCOUNTER (EMERGENCY)
Age: 52
Discharge: HOME | End: 2022-05-15
Attending: FAMILY MEDICINE
Payer: MEDICAID

## 2022-05-15 VITALS
WEIGHT: 157 LBS | OXYGEN SATURATION: 98 % | TEMPERATURE: 98.9 F | HEART RATE: 71 BPM | RESPIRATION RATE: 18 BRPM | HEIGHT: 70 IN | SYSTOLIC BLOOD PRESSURE: 106 MMHG | DIASTOLIC BLOOD PRESSURE: 73 MMHG | BODY MASS INDEX: 22.48 KG/M2

## 2022-05-15 DIAGNOSIS — L20.82 FLEXURAL ECZEMA: Primary | ICD-10-CM

## 2022-05-15 PROCEDURE — 99283 EMERGENCY DEPT VISIT LOW MDM: CPT

## 2022-05-15 RX ORDER — TRIAMCINOLONE ACETONIDE 1 MG/G
CREAM TOPICAL
Qty: 80 G | Refills: 3 | Status: SHIPPED | OUTPATIENT
Start: 2022-05-15

## 2022-05-15 ASSESSMENT — ENCOUNTER SYMPTOMS
RESPIRATORY NEGATIVE: 1
EYES NEGATIVE: 1
ALLERGIC/IMMUNOLOGIC NEGATIVE: 1
ABDOMINAL PAIN: 0
GASTROINTESTINAL NEGATIVE: 1
DIARRHEA: 0

## 2022-05-15 ASSESSMENT — PAIN DESCRIPTION - ONSET: ONSET: ON-GOING

## 2022-05-15 ASSESSMENT — PAIN - FUNCTIONAL ASSESSMENT: PAIN_FUNCTIONAL_ASSESSMENT: 0-10

## 2022-05-15 ASSESSMENT — PAIN SCALES - GENERAL: PAINLEVEL_OUTOF10: 10

## 2022-05-15 ASSESSMENT — PAIN DESCRIPTION - ORIENTATION: ORIENTATION: RIGHT

## 2022-05-15 ASSESSMENT — PAIN DESCRIPTION - PAIN TYPE: TYPE: ACUTE PAIN

## 2022-05-15 NOTE — ED PROVIDER NOTES
[address] [facility] ED  eMERGENCY dEPARTMENT eNCOUnter      Pt Name: Brigitte Longoria  MRN: [id number]  [place] 1970  Date of evaluation: 5/15/2022  Provider: Eden Chandler MD    CHIEF COMPLAINT       Chief Complaint   Patient presents with    Rash     hx excema, to medial right ankle, does not have meds         HISTORY OF PRESENT ILLNESS   (Location/Symptom, Timing/Onset,Context/Setting, Quality, Duration, Modifying Factors, Severity)  Note limiting factors. Brigitte Longoria is a 50 y.o. male who presents to the emergency department rash      The history is provided by the patient. Rash  Location:  Leg  Severity:  Moderate  Onset quality:  Gradual  Timing:  Intermittent  Progression:  Worsening  Chronicity:  Recurrent  Context: plant contact and pollen    Context: not animal contact, not chemical exposure, not diapers, not eggs, not exposure to similar rash, not food, not hot tub use, not insect bite/sting, not medications, not new detergent/soap, not nuts, not sick contacts and not sun exposure    Relieved by:  Nothing  Worsened by:  Nothing  Ineffective treatments:  None tried  Associated symptoms: no abdominal pain, no diarrhea and no fatigue        NursingNotes were reviewed. REVIEW OF SYSTEMS    (2-9 systems for level 4, 10 or more for level 5)     Review of Systems   Constitutional: Negative. Negative for fatigue. HENT: Negative. Eyes: Negative. Respiratory: Negative. Cardiovascular: Negative. Gastrointestinal: Negative. Negative for abdominal pain and diarrhea. Endocrine: Negative. Genitourinary: Negative. Musculoskeletal: Negative. Skin: Positive for rash. Allergic/Immunologic: Negative. Neurological: Negative. Hematological: Negative. Psychiatric/Behavioral: Negative. All other systems reviewed and are negative. Except as noted above the remainder of the review of systems was reviewed and negative.        PAST MEDICAL HISTORY     Past Medical Types: Cigarettes     Quit date: 2022     Years since quittin.0    Smokeless tobacco: Never Used   Substance and Sexual Activity    Alcohol use: Not Currently     Comment: \"a beer today\"    Drug use: Not Currently     Types: Marijuana Curlie Opal)     Comment: \"Ill be honest, I smoke a blunt\"    Sexual activity: Not on file   Other Topics Concern    Not on file   Social History Narrative    Not on file     Social Determinants of Health     Financial Resource Strain: Low Risk     Difficulty of Paying Living Expenses: Not hard at all   Food Insecurity: No Food Insecurity    Worried About Running Out of Food in the Last Year: Never true    Nhan of Food in the Last Year: Never true   Transportation Needs:     Lack of Transportation (Medical): Not on file    Lack of Transportation (Non-Medical):  Not on file   Physical Activity:     Days of Exercise per Week: Not on file    Minutes of Exercise per Session: Not on file   Stress:     Feeling of Stress : Not on file   Social Connections:     Frequency of Communication with Friends and Family: Not on file    Frequency of Social Gatherings with Friends and Family: Not on file    Attends Orthodox Services: Not on file    Active Member of [address] [place] [address] [employer] or Organizations: Not on file    Attends Club or Organization Meetings: Not on file    Marital Status: Not on file   Intimate Partner Violence:     Fear of Current or Ex-Partner: Not on file    Emotionally Abused: Not on file    Physically Abused: Not on file    Sexually Abused: Not on file   Housing Stability:     Unable to Pay for Housing in the Last Year: Not on file    Number of [place] in the Last Year: Not on file    Unstable Housing in the Last Year: Not on file       SCREENINGS    P[place]co Coma Scale  Eye Opening: Spontaneous  Best Verbal Response: Oriented  Best Motor Response: Obeys commands  P[place]co Coma Scale Score: 15 @FLOW([id number])@      PHYSICAL EXAM    (up to 7 for level 4, 8 or more for level 5)     ED Triage Vitals [05/15/22 1220]   BP Temp Temp Source Pulse Resp SpO2 Height Weight   106/73 98.9 °F (37.2 °C) Oral 71 18 98 % 5' 10\" (1.778 m) 157 lb (71.2 kg)       Physical Exam  Vitals and nursing note reviewed. Constitutional:       Appearance: He is well-developed. HENT:      Head: Normocephalic and atraumatic. Right Ear: External ear normal.      Left Ear: External ear normal.      Nose: Nose normal.   Eyes:      Pupils: Pupils are equal, round, and reactive to light. Cardiovascular:      Rate and Rhythm: Normal rate and regular rhythm. Heart sounds: Normal heart sounds. Pulmonary:      Effort: Pulmonary effort is normal. No respiratory distress. Breath sounds: Normal breath sounds. No wheezing or rales. Chest:      Chest wall: No tenderness. Abdominal:      General: Bowel sounds are normal.      Palpations: Abdomen is soft. Musculoskeletal:         General: Normal range of motion. Cervical back: Normal range of motion and neck supple. Skin:     General: Skin is warm and dry. Comments: Erythematous scaly eczematoid rash noted on both ankle high in both knees   Neurological:      Mental Status: He is alert and oriented to person, place, and time. Cranial Nerves: No cranial nerve deficit. Sensory: No sensory deficit. Motor: No abnormal muscle tone. Coordination: Coordination normal.      Deep Tendon Reflexes: Reflexes normal.   Psychiatric:         Behavior: Behavior normal.         Thought Content:  Thought content normal.         Judgment: Judgment normal.         DIAGNOSTIC RESULTS     EKG: All EKG's are interpreted by the Emergency Department Physician who either signs or Co-signsthis chart in the absence of a cardiologist.        RADIOLOGY:   Non-plain filmimages such as CT, Ultrasound and MRI are read by the radiologist. Plain radiographic images are visualized and preliminarily interpreted by the emergency physician with the below findings:      Interpretation per the Radiologist below, if available at the time ofthis note:    No orders to display         ED BEDSIDE ULTRASOUND:   Performed by ED Physician - none    LABS:  Labs Reviewed - No data to display    All other labs were within normal range or not returned as of this dictation. EMERGENCY DEPARTMENT COURSE and DIFFERENTIAL DIAGNOSIS/MDM:   Vitals:    Vitals:    05/15/22 1220   BP: 106/73   Pulse: 71   Resp: 18   Temp: 98.9 °F (37.2 °C)   TempSrc: Oral   SpO2: 98%   Weight: 157 lb (71.2 kg)   Height: 5' 10\" (1.778 m)           MDM  Number of Diagnoses or Management Options  Flexural eczema: minor     Amount and/or Complexity of Data Reviewed  Tests in the radiology section of CPT®: ordered and reviewed          CONSULTS:  None    PROCEDURES:  Unless otherwise noted below, none     Procedures    FINAL IMPRESSION      1. Flexural eczema          DISPOSITION/PLAN   DISPOSITION Decision To Discharge 05/15/2022 01:02:03 PM      PATIENT REFERRED TO:  No follow-up provider specified.     DISCHARGE MEDICATIONS:  Discharge Medication List as of 5/15/2022  1:04 PM      START taking these medications    Details   triamcinolone (KENALOG) 0.1 % cream Apply topically 2 times daily for 1 week., Disp-80 g, R-3, Normal                (Please note thatportions of this note were completed with a voice recognition program.  Efforts were made to edit the dictations but occasionally words are mis-transcribed.)    Ezekiel Moreira MD (electronically signed)  Attending Emergency Physician          Jaycob Amaral MD  05/15/22 2046

## 2022-05-15 NOTE — ED TRIAGE NOTES
PT to ed from [facility] with eczema flare up. PT reports area to medial right ankle \"gettign worse\" and BUE, BLE behind knees, and BLE ankles. Pt states that he does not have any meds to treat. Pt skin dry, flaky with redness noted to medial right ankle.  PT dnei

## 2022-05-18 ENCOUNTER — OFFICE VISIT (OUTPATIENT)
Dept: CARDIOLOGY CLINIC | Age: 52
End: 2022-05-18
Payer: MEDICAID

## 2022-05-18 VITALS
SYSTOLIC BLOOD PRESSURE: 110 MMHG | HEART RATE: 79 BPM | WEIGHT: 155 LBS | BODY MASS INDEX: 22.24 KG/M2 | DIASTOLIC BLOOD PRESSURE: 70 MMHG | OXYGEN SATURATION: 99 %

## 2022-05-18 DIAGNOSIS — I21.3 ST ELEVATION MYOCARDIAL INFARCTION (STEMI), UNSPECIFIED ARTERY (HCC): Primary | ICD-10-CM

## 2022-05-18 DIAGNOSIS — I21.3 ST ELEVATION MYOCARDIAL INFARCTION (STEMI), UNSPECIFIED ARTERY (HCC): ICD-10-CM

## 2022-05-18 DIAGNOSIS — I50.21 ACUTE SYSTOLIC CHF (CONGESTIVE HEART FAILURE), NYHA CLASS 1 (HCC): ICD-10-CM

## 2022-05-18 LAB
ANION GAP SERPL CALCULATED.3IONS-SCNC: 13 MEQ/L (ref 9–15)
BUN BLDV-MCNC: 18 MG/DL (ref 6–20)
CALCIUM SERPL-MCNC: 9.9 MG/DL (ref 8.5–9.9)
CHLORIDE BLD-SCNC: 99 MEQ/L (ref 95–107)
CO2: 26 MEQ/L (ref 20–31)
CREAT SERPL-MCNC: 1.24 MG/DL (ref 0.7–1.2)
GFR AFRICAN AMERICAN: >60
GFR NON-AFRICAN AMERICAN: >60
GLUCOSE BLD-MCNC: 111 MG/DL (ref 70–99)
POTASSIUM SERPL-SCNC: 4.1 MEQ/L (ref 3.4–4.9)
SODIUM BLD-SCNC: 138 MEQ/L (ref 135–144)

## 2022-05-18 PROCEDURE — 99213 OFFICE O/P EST LOW 20 MIN: CPT | Performed by: INTERNAL MEDICINE

## 2022-05-18 PROCEDURE — 3017F COLORECTAL CA SCREEN DOC REV: CPT | Performed by: INTERNAL MEDICINE

## 2022-05-18 PROCEDURE — G8420 CALC BMI NORM PARAMETERS: HCPCS | Performed by: INTERNAL MEDICINE

## 2022-05-18 PROCEDURE — 1036F TOBACCO NON-USER: CPT | Performed by: INTERNAL MEDICINE

## 2022-05-18 PROCEDURE — G8427 DOCREV CUR MEDS BY ELIG CLIN: HCPCS | Performed by: INTERNAL MEDICINE

## 2022-05-18 PROCEDURE — 1111F DSCHRG MED/CURRENT MED MERGE: CPT | Performed by: INTERNAL MEDICINE

## 2022-05-18 RX ORDER — FUROSEMIDE 20 MG/1
20 TABLET ORAL DAILY
Qty: 60 TABLET | Refills: 5 | Status: CANCELLED | OUTPATIENT
Start: 2022-05-18

## 2022-05-18 RX ORDER — ATORVASTATIN CALCIUM 80 MG/1
80 TABLET, FILM COATED ORAL NIGHTLY
Qty: 30 TABLET | Refills: 5 | Status: SHIPPED | OUTPATIENT
Start: 2022-05-18

## 2022-05-18 RX ORDER — SPIRONOLACTONE 25 MG/1
25 TABLET ORAL DAILY
Qty: 30 TABLET | Refills: 5 | Status: SHIPPED | OUTPATIENT
Start: 2022-05-18

## 2022-05-18 RX ORDER — ASPIRIN 81 MG/1
81 TABLET, CHEWABLE ORAL DAILY
Qty: 30 TABLET | Refills: 11 | Status: SHIPPED | OUTPATIENT
Start: 2022-05-18

## 2022-05-18 RX ORDER — CARVEDILOL 12.5 MG/1
12.5 TABLET ORAL 2 TIMES DAILY WITH MEALS
Qty: 60 TABLET | Refills: 5 | Status: SHIPPED | OUTPATIENT
Start: 2022-05-18

## 2022-05-18 ASSESSMENT — ENCOUNTER SYMPTOMS
COLOR CHANGE: 0
NAUSEA: 0
ABDOMINAL PAIN: 0
VOMITING: 0
CONSTIPATION: 0
COUGH: 0
EYE REDNESS: 0
RHINORRHEA: 0
SHORTNESS OF BREATH: 0
CHEST TIGHTNESS: 0
DIARRHEA: 0
APNEA: 0
WHEEZING: 0

## 2022-05-18 NOTE — PROGRESS NOTES
Chief Complaint   Patient presents with    2 Week Follow-Up       Patient presents for initial medical evaluation. Patient is followed on a regular basis by Dr. Cali Huertas MD.     CAD status post STEMI on 2022 successful PCI of mid LAD placement of 4 x 28 KARUNA and 3.5 x 12 mm KARUNA. Severe ischemic cardiomyopathy EF 20%  Hypertension  Hyperlipidemia  Smoker  TTE 2022 EF 20%  Coronary angiogram 2022 left main mild disease, LAD mid 99% stenosis status post PCI KARUNA x2, circumflex stents widely patent, RCA nondominant    2022  First clinic visit hospital follow-up after STEMI. Patient reports feeling well, reports that he stop smoking after the STEMI on 2022  Denies chest pain  Patient lives in [place], he walked about an hour to get to this appointment. Denies chest pain or shortness of breath during this activity.   Patient still wearing his LifeVest      Patient Active Problem List   Diagnosis    Facial abscess    STEMI (ST elevation myocardial infarction) ([facility] [address]       Past Surgical History:   Procedure Laterality Date    CORONARY ANGIOPLASTY WITH STENT PLACEMENT      MOUTH SURGERY         Social History     Socioeconomic History    Marital status: Single     Spouse name: None    Number of children: None    Years of education: None    Highest education level: None   Occupational History    None   Tobacco Use    Smoking status: Former Smoker     Packs/day: 0.50     Years: 30.00     Pack years: 15.00     Types: Cigarettes     Quit date: 2022     Years since quittin.0    Smokeless tobacco: Never Used   Substance and Sexual Activity    Alcohol use: Not Currently     Comment: \"a beer today\"    Drug use: Not Currently     Types: Marijuana Curlie Opal)     Comment: \"Ill be honest, I smoke a blunt\"    Sexual activity: None   Other Topics Concern    None   Social History Narrative    None     Social Determinants of Health     Financial Resource Strain: Low Risk     Difficulty of Paying Living Expenses: Not hard at all   Food Insecurity: No Food Insecurity    Worried About Running Out of Food in the Last Year: Never true    Ran Out of Food in the Last Year: Never true   Transportation Needs:     Lack of Transportation (Medical): Not on file    Lack of Transportation (Non-Medical): Not on file   Physical Activity:     Days of Exercise per Week: Not on file    Minutes of Exercise per Session: Not on file   Stress:     Feeling of Stress : Not on file   Social Connections:     Frequency of Communication with Friends and Family: Not on file    Frequency of Social Gatherings with Friends and Family: Not on file    Attends [employer] Services: Not on file    Active Member of [address] [place] [address] [employer] or Organizations: Not on file    Attends Club or Organization Meetings: Not on file    Marital Status: Not on file   Intimate Partner Violence:     Fear of Current or Ex-Partner: Not on file    Emotionally Abused: Not on file    Physically Abused: Not on file    Sexually Abused: Not on file   Housing Stability:     Unable to Pay for Housing in the Last Year: Not on file    Number of [place] in the Last Year: Not on file    Unstable Housing in the Last Year: Not on file       History reviewed. No pertinent family history. Current Outpatient Medications   Medication Sig Dispense Refill    triamcinolone (KENALOG) 0.1 % cream Apply topically 2 times daily for 1 week.  80 g 3    aspirin 81 MG chewable tablet Take 1 tablet by mouth daily 30 tablet 3    atorvastatin (LIPITOR) 80 MG tablet Take 1 tablet by mouth nightly 30 tablet 3    spironolactone (ALDACTONE) 25 MG tablet Take 1 tablet by mouth daily 30 tablet 3    ticagrelor (BRILINTA) 90 MG TABS tablet Take 1 tablet by mouth 2 times daily 60 tablet 11    furosemide (LASIX) 20 MG tablet Take 1 tablet by mouth daily 60 tablet 3    carvedilol (COREG) 12.5 MG tablet Take 1 tablet by mouth 2 times daily (with meals) 60 tablet 3    sacubitril-valsartan (ENTRESTO) 24-26 MG per tablet Take 1 tablet by mouth 2 times daily 60 tablet 3    nitroGLYCERIN (NITROSTAT) 0.4 MG SL tablet Place 1 tablet under the tongue every 5 minutes as needed for Chest pain (x maximum of 3 doses. If CP unrelieved after 3 doses, call 911) 25 tablet 1    pantoprazole (PROTONIX) 40 MG tablet Take 1 tablet by mouth every morning (before breakfast) 30 tablet 3     No current facility-administered medications for this visit. Patient has no known allergies. Review of Systems:  Review of Systems   Constitutional: Negative for activity change, chills, diaphoresis and fever. HENT: Negative for congestion, ear pain, nosebleeds and rhinorrhea. Eyes: Negative for redness and visual disturbance. Respiratory: Negative for apnea, cough, chest tightness, shortness of breath and wheezing. Cardiovascular: Negative for chest pain, palpitations and leg swelling. Gastrointestinal: Negative for abdominal pain, constipation, diarrhea, nausea and vomiting. Genitourinary: Negative for difficulty urinating and dysuria. Musculoskeletal: Negative. Negative for joint swelling. Skin: Negative for color change, rash and wound. Neurological: Negative for dizziness, syncope, weakness, numbness and headaches. Psychiatric/Behavioral: Negative. VITALS:  Blood pressure 110/70, pulse 79, weight 155 lb (70.3 kg), SpO2 99 %. Body mass index is 22.24 kg/m². Physical Examination:  Physical Exam  Vitals and nursing note reviewed. Constitutional:       Appearance: Normal appearance. HENT:      Head: Normocephalic and atraumatic. Mouth/Throat:      Mouth: Mucous membranes are moist.      Pharynx: Oropharynx is clear. Eyes:      Extraocular Movements: Extraocular movements intact. Conjunctiva/sclera: Conjunctivae normal.      Pupils: Pupils are equal, round, and reactive to light. Cardiovascular:      Rate and Rhythm: Normal rate and regular rhythm. Pulses: Normal pulses. Heart sounds: Normal heart sounds. Pulmonary:      Effort: Pulmonary effort is normal.      Breath sounds: Normal breath sounds. Abdominal:      General: Abdomen is flat. Bowel sounds are normal.      Palpations: Abdomen is soft. Musculoskeletal:         General: Normal range of motion. Cervical back: Normal range of motion and neck supple. Skin:     General: Skin is warm. Neurological:      General: No focal deficit present. Mental Status: He is alert and oriented to person, place, and time. Mental status is at baseline. Psychiatric:         Mood and Affect: Mood normal.        EKG: Sinus rhythm, LBBB    No orders of the defined types were placed in this encounter. The ASCVD Risk score (Brooklyn Baumann., et al., 2013) failed to calculate for the following reasons: The patient has a prior MI or stroke diagnosis     ASSESSMENT:     Diagnosis Orders   1. ST elevation myocardial infarction (STEMI), unspecified artery (HCC)       PLAN:     CAD status post STEMI on 4/19/2022 successful PCI of mid LAD placement of 4 x 28 KARUNA and 3.5 x 12 mm KARUNA. Patient cardiac wise stable, denies chest pain with physical activity  Continue aspirin and atorvastatin indefinitely for secondary prevention of CAD  Continue Coreg  Continue Brilinta ideally for 1 year stop date 4/19/2023  Patient was referred to cardiac rehab, reports that he missed the appointment because he missed his ride. Severe ischemic cardiomyopathy EF 20%  Patient euvolemic  Continue Coreg 12.5 mg twice daily  Continue Entresto  I will stop Lasix given creatinine went up on 5/4/2022  Continue spironolactone.   Potassium 4.0 on 5/4/2022  Continue wearing life vest.  I will order an echocardiogram in 2 more months and if EF is below 35% patient will need to be referred for ICD placement    Hypertension  Patient brought a blood pressure log where it shows that his blood pressures are ranging between 110s and 130s.  Continue Entresto  Continue Coreg    Hyperlipidemia  Continue atorvastatin    Smoker  Quit smoking April 19, 2022    Return to clinic in 3 months    Recommended patient to eat diets that emphasize high intakes of vegetables, fruits, nuts, whole grains, lean vegetable or animal protein, and fish.  As per 2019 ACC/AHA guideline on primary prevention of CVD     Recommended patient to engage in at least 150 minutes per week of accumulated moderate-intensity physical activity or 75 minutes per week of vigorous-intensity physical activity as per 2019 ACC/AHA guideline on primary prevention of CVD

## 2022-05-31 ENCOUNTER — HOSPITAL ENCOUNTER (OUTPATIENT)
Dept: CARDIAC REHAB | Age: 52
Setting detail: THERAPIES SERIES
Discharge: HOME | End: 2022-05-31
Payer: MEDICAID

## 2022-05-31 VITALS — OXYGEN SATURATION: 98 % | BODY MASS INDEX: 22.24 KG/M2 | RESPIRATION RATE: 18 BRPM | HEART RATE: 60 BPM | HEIGHT: 70 IN

## 2022-05-31 PROCEDURE — G0422 INTENS CARDIAC REHAB W/EXERC: HCPCS

## 2022-05-31 ASSESSMENT — PATIENT HEALTH QUESTIONNAIRE - PHQ9
SUM OF ALL RESPONSES TO PHQ QUESTIONS 1-9: 1
2. FEELING DOWN, DEPRESSED OR HOPELESS: 0
1. LITTLE INTEREST OR PLEASURE IN DOING THINGS: 1
SUM OF ALL RESPONSES TO PHQ9 QUESTIONS 1 & 2: 1
SUM OF ALL RESPONSES TO PHQ QUESTIONS 1-9: 1

## 2022-05-31 ASSESSMENT — EJECTION FRACTION
EF_VALUE: 20
EF_VALUE: 20

## 2022-05-31 ASSESSMENT — EXERCISE STRESS TEST
PEAK_RPD: 2
PEAK_RPE: 11
PEAK_HR: 74
PEAK_BP: 148/88
PEAK_BP: 148/70

## 2022-05-31 ASSESSMENT — LIFESTYLE VARIABLES
SMOKELESS_TOBACCO: NO
ALCOHOL_TYPE: BEER
ALCOHOL_USE: SPECIAL

## 2022-05-31 NOTE — CARDIO/PULMONARY
Patient arrived for cardiac rehab intake session. Patient completed 6 MWT and exercised on nustep for 10 minutes. Patient wearing life vest. Patient instructed on wearing life vest at all times as prescribed by Dr Yenny Pineda. Patient aware he will not be able to exercise if he is not wearing vest. Patient will attend [employer] classes. Noted episodes of bradycardia while at rest. Patient asymptomatic. Will send report to Dr Yenny Pineda to make him aware.  Electronically signed by Amber Monroy RN on 5/31/2022 at 11:58 AM Neuro

## 2022-06-01 ENCOUNTER — OFFICE VISIT (OUTPATIENT)
Dept: CARDIOLOGY CLINIC | Age: 52
End: 2022-06-01
Payer: MEDICAID

## 2022-06-01 VITALS
BODY MASS INDEX: 22.76 KG/M2 | HEIGHT: 70 IN | HEART RATE: 60 BPM | RESPIRATION RATE: 18 BRPM | SYSTOLIC BLOOD PRESSURE: 150 MMHG | DIASTOLIC BLOOD PRESSURE: 100 MMHG | OXYGEN SATURATION: 98 % | WEIGHT: 159 LBS

## 2022-06-01 DIAGNOSIS — I50.22 CHRONIC SYSTOLIC CHF (CONGESTIVE HEART FAILURE), NYHA CLASS 1 (HCC): ICD-10-CM

## 2022-06-01 DIAGNOSIS — I25.5 ISCHEMIC CARDIOMYOPATHY: ICD-10-CM

## 2022-06-01 DIAGNOSIS — I10 HYPERTENSION, UNSPECIFIED TYPE: ICD-10-CM

## 2022-06-01 DIAGNOSIS — Z98.61 CAD S/P PERCUTANEOUS CORONARY ANGIOPLASTY: ICD-10-CM

## 2022-06-01 DIAGNOSIS — E78.5 DYSLIPIDEMIA: ICD-10-CM

## 2022-06-01 DIAGNOSIS — I25.10 CAD S/P PERCUTANEOUS CORONARY ANGIOPLASTY: ICD-10-CM

## 2022-06-01 DIAGNOSIS — Z87.891 FORMER TOBACCO USE: ICD-10-CM

## 2022-06-01 DIAGNOSIS — I21.02 STEMI INVOLVING LEFT ANTERIOR DESCENDING CORONARY ARTERY (HCC): ICD-10-CM

## 2022-06-01 PROCEDURE — 3017F COLORECTAL CA SCREEN DOC REV: CPT | Performed by: PHYSICIAN ASSISTANT

## 2022-06-01 PROCEDURE — 99214 OFFICE O/P EST MOD 30 MIN: CPT | Performed by: PHYSICIAN ASSISTANT

## 2022-06-01 PROCEDURE — G8427 DOCREV CUR MEDS BY ELIG CLIN: HCPCS | Performed by: PHYSICIAN ASSISTANT

## 2022-06-01 PROCEDURE — 1036F TOBACCO NON-USER: CPT | Performed by: PHYSICIAN ASSISTANT

## 2022-06-01 PROCEDURE — G8420 CALC BMI NORM PARAMETERS: HCPCS | Performed by: PHYSICIAN ASSISTANT

## 2022-06-01 RX ORDER — HYDRALAZINE HYDROCHLORIDE 50 MG/1
50 TABLET, FILM COATED ORAL 2 TIMES DAILY
Qty: 90 TABLET | Refills: 3 | Status: SHIPPED | OUTPATIENT
Start: 2022-06-01

## 2022-06-01 ASSESSMENT — ENCOUNTER SYMPTOMS
BLOOD IN STOOL: 0
VOMITING: 0
COLOR CHANGE: 0
ABDOMINAL DISTENTION: 0
CHEST TIGHTNESS: 0
COUGH: 0
SHORTNESS OF BREATH: 0
NAUSEA: 0

## 2022-06-01 NOTE — PROGRESS NOTES
Patient: Pamela Garsia  YOB: 1970  MRN: [id number]    Chief Complaint:  Chief Complaint   Patient presents with    Congestive Heart Failure     systolic         Subjective/HPI     6/1/22: Here for follow-up of systolic congestive heart failure and ischemic cardiomyopathy. Was seen for initial CHF clinic evaluation on 5/4/2022. Since his initial visit, he is doing well with no new or worsening heart failure symptoms. He is compliant with his LifeVest.  Had most recent follow-up with Dr. Londa Leventhal on 5/18/2022 at which time his Lasix 20 mg daily was discontinued due to mild elevation in his creatinine. Patient is very active and walks the majority of the places that he goes. He walked between 4 and 5 miles from his home to his office appointment today. He is compliant with all of his medications and with low-sodium diet and fluid restriction. He has not been checking daily weights at home but weight is stable in office today at 159 pounds which is the exact same weight he was at initial office visit. He denies shortness of breath or dyspnea exertion, chest pain, palpitations, diaphoresis, paroxysmal nocturnal dyspnea, orthopnea, lower extremity edema, dizziness, lightheadedness, syncope, fever or chills. He is following with cardiac rehab with last visit yesterday and is tolerating well. Most recent labs reviewed and documented below. BMP 5/18/2022: Sodium 138, potassium 4.1, chloride 99, total CO2 26, BUN 18, creatinine elevated 1.24, GFR greater than 60, glucose 111  BMP on 5/4/2022: Sodium 139, potassium 4.0, chloride 98, total CO2 28, BUN elevated 21, creatinine elevated 1.27, GFR greater than 60, glucose 106  proBNP on 5/4/2022: 902 compared to 3503 on 4/19/2022    Blood pressure elevated in office today at 147/106 on the left and 150/100 on the right, heart rate 60 bpm, pulse ox 98% on room air.   Weight is 159 pounds which is the exact same weight he was at last office visit on 5/4/2022. 5/4/22 CHF clinic visit #1: This is a very pleasant 44-year-old -American male with past medical history significant for coronary artery disease status post prior PCI of circumflex in 2017 while living in [place] and most recent PCI drug-eluting stent of the LAD on 4/19/2022 after presenting to [facility] with ST elevation MI, ischemic cardiomyopathy with EF of 38%, acute systolic congestive heart failure, hypertension, dyslipidemia, former tobacco abuse and former daily EtOH who presents for initial CHF clinic evaluation following recent hospital admission. Patient had originally presented to [facility] ER on 4/19/2022 with complaints of intermittent chest pain for 2 days prior to presentation. In ER he was noted to have ST elevation MI on EKG and was taken emergently for cardiac catheterization with Dr. Oneida Tee. Cardiac catheterization on 4/19/2022 showed focal 99% proximal LAD stenosis which patient went PCI with drug-eluting stent, otherwise patient had widely patent stent in the proximal to mid circumflex, mild disease the left main and small nondominant RCA with proximal to mid segment severe stenosis. He was initiated on dual antiplatelet therapy with aspirin and Brilinta and subsequently admitted for evaluation. He underwent echocardiogram on 4/20/2022 which revealed severely reduced LV systolic function with EF 20%, 1+ MR, mild concentric left ventricular hypertrophy, restrictive filling pattern. He was optimized on cardiac and heart failure medications. During his mission he was noted to have multiple episodes of nonsustained ventricular tachycardia and he was fitted for a LifeVest prior to discharge. He was subsequently discharged in stable condition on 4/21/2022. Since hospital discharge, patient states he is doing well. He has brought with him all of his current medication bottles for review.   He is compliant with dual antiplatelet therapy of aspirin and Brilinta as well as all other medications. He states that he has modified his diet significantly and is now air frying most of his foods instead of cooking with oils or grease. He has eliminated use of salt from his diet and currently is using Mrs. Roper. Also he states that he stopped smoking cold turkey on day of discharge from deviously smoking about a quarter of a pack per day x 25 to 30 years. Also states that he has quit drinking when previously was drinking 1 to 2-24 ounce beers per day and also has stopped smoking marijuana which he had done occasionally. He denies shortness of breath or dyspnea on exertion, chest pain, palpitations, diaphoresis, paroxysmal nocturnal dyspnea, orthopnea, lower extremity edema, dizziness, lightheadedness, syncope, fever or chills. He is compliant with LifeVest.    Patient had relocated to the area from [place] in early December and states that he is planning to live here long-term. He is currently living with a friend but is in the process of obtaining his own residence. He does have a 11year-old son who resides in [place] with his mom. He is scheduled to start cardiac rehab on 5/31/2022. Most recent labs reviewed and documented below  BMP 4/21/2022: Sodium 136, potassium 3.8, chloride 98, total CO2 24, BUN 9, creatinine 1.10, GFR greater than 60, glucose 84  CBC 4/21/2022: WBC 6.5, hemoglobin 14.5, hematocrit 44.0, platelets 710  Lipid panel 4/21/2022: Total cholesterol 179, triglycerides 71, HDL 69, LDL 96  Hemoglobin A1c on 4/20/2022: 5.0  Magnesium level on 4/20/2022: 2.0  TSH on 4/19/2022: 0.913  proBNP on 4/19/2022: 3503       Most recent diagnostic testing reviewed and documented below. EKG 4/20/22: ? SR 71, LBBB, ST depression with T wave inversion leads I, aVL and V4-V6, QTc 554ms    Echocardiogram 4/20/22:  Conclusions   Summary   Normal mitral valve structure and function.    1+ MR   Left ventricular ejection fraction is visually estimated at 20%.   Global Hypokinesis. Mild CLVH   Restrictive filling pattern. Signature   ----------------------------------------------------------------   Electronically signed by Chuy Butler MD(Interpreting   physician) on 04/20/2022 09:56 AM   ----------------------------------------------------------------    [facility] 4/19/22:   Angiographic Findings   Cardiac Arteries and Lesion Findings  LMCA: Big size vessel mild disease  LAD: Big sized vessel wraps around the corner proximal focal 99% stenosis  status post PCI as described above    Lesion on Mid LAD: Mid subsection. 99% stenosis reduced to 0%. Pre    procedure GIOVANNA III flow was noted. Post Procedure GIOVANNA III flow was    present. The guidewire cross was successful. Good runoff was present. The    lesion was diagnosed as High Risk (C). Culprit lesion. Devices used    - EUPHORA 2.0X12MM . 1 inflation(s) to a max pressure of: 12 steve. - Abbott BMW [place] I with \"j\" . 014 190 cm. Number of passes: 1.    - Xience Kasia 4mm x 28mm. 1 inflation(s) to a max pressure of: 14    steve. - Resolute Weed 35.mm x 12mm. 1 inflation(s) to a max pressure of: 16    steve. - NC EUPHORA BALLOON 4.00MM X 12MM. 1 inflation(s) to a max pressure    of: 20 steve. LCx: Big size vessel dominant proximal to mid segment stent widely patent  RCA: Small size vessel nondominant proximal to mid segment severe stenosis   Coronary Tree   Dominance: Right   VA  LV function assessed as:Abnormal.  Ejection Fraction    - Method: LV gram. EF%: 40.      Vital signs stable in office today. Blood pressure 131/84, heart rate 64, pulse ox 97% on room air. Weight is 159 pounds.       PMHx:  Recent STEMI on 4/19/22 s/p PCI KARUNA of LAD  ICMP EF 20% per echo 4/20/22  Abnormal EKG  NSVT during admission in 4/2022  HTN  Dyslipidemia  HX PCI to circ in 2017  Right eye blindness  Asthma      PSHx:  Oral surgery  Eye surgery    Social Hx:  Tobacco abuse--Quit on 4/21/22. 1/4ppd x 30 years  Former alcohol--Quit 4/21/22 drank 1-2, 24 oz beer per day  Former marijuana use      Family Hx:  No known CAD      No Known Allergies    Current Outpatient Medications   Medication Sig Dispense Refill    hydrALAZINE (APRESOLINE) 50 MG tablet Take 1 tablet by mouth 2 times daily 90 tablet 3    sacubitril-valsartan (ENTRESTO) 24-26 MG per tablet Take 1 tablet by mouth 2 times daily 60 tablet 5    carvedilol (COREG) 12.5 MG tablet Take 1 tablet by mouth 2 times daily (with meals) 60 tablet 5    spironolactone (ALDACTONE) 25 MG tablet Take 1 tablet by mouth daily 30 tablet 5    atorvastatin (LIPITOR) 80 MG tablet Take 1 tablet by mouth nightly 30 tablet 5    aspirin 81 MG chewable tablet Take 1 tablet by mouth daily 30 tablet 11    triamcinolone (KENALOG) 0.1 % cream Apply topically 2 times daily for 1 week. 80 g 3    ticagrelor (BRILINTA) 90 MG TABS tablet Take 1 tablet by mouth 2 times daily 60 tablet 11    nitroGLYCERIN (NITROSTAT) 0.4 MG SL tablet Place 1 tablet under the tongue every 5 minutes as needed for Chest pain (x maximum of 3 doses. If CP unrelieved after 3 doses, call 911) 25 tablet 1    pantoprazole (PROTONIX) 40 MG tablet Take 1 tablet by mouth every morning (before breakfast) 30 tablet 3     No current facility-administered medications for this visit. Past Medical History:   Diagnosis Date    ADHD (attention deficit hyperactivity disorder)     Anticoagulant long-term use     brilenta and aspirin    Asthma     CAD (coronary artery disease)     Hyperlipidemia     Hypertension     MI [facility] infarction) [facility] [address]     Substance abuse ([facility] [address]     history of smoking marijuana 4/18/2022 last time.         Past Surgical History:   Procedure Laterality Date    CORONARY ANGIOPLASTY WITH STENT PLACEMENT      LIP SURGERY      1985    MOUTH SURGERY         Social History     Socioeconomic History    Marital status: Single     Spouse name: None    Number of children: None    Years of education: None    Highest education level: None   Occupational History    None   Tobacco Use    Smoking status: Former Smoker     Packs/day: 0.50     Years: 30.00     Pack years: 15.00     Types: Cigarettes     Quit date: 2022     Years since quittin.1    Smokeless tobacco: Never Used   Vaping Use    Vaping Use: Never used   Substance and Sexual Activity    Alcohol use: Yes     Comment: beer occassionally, last 2022    Drug use: Not Currently     Types: Marijuana Leonardo Berumen)     Comment: last use 2022    Sexual activity: Not Currently     Partners: Female   Other Topics Concern    None   Social History Narrative    None     Social Determinants of Health     Financial Resource Strain: Low Risk     Difficulty of Paying Living Expenses: Not hard at all   Food Insecurity: No Food Insecurity    Worried About [address] [employer] in the Last Year: Never true    [facility] in the Last Year: Never true   Transportation Needs:     Lack of Transportation (Medical): Not on file    Lack of Transportation (Non-Medical):  Not on file   Physical Activity:     Days of Exercise per Week: Not on file    Minutes of Exercise per Session: Not on file   Stress:     Feeling of Stress : Not on file   Social Connections:     Frequency of Communication with Friends and Family: Not on file    Frequency of Social Gatherings with Friends and Family: Not on file    Attends Jainism Services: Not on file    Active Member of [address] [place] [address] or Organizations: Not on file    Attends Club or Organization Meetings: Not on file    Marital Status: Not on file   Intimate Partner Violence:     Fear of Current or Ex-Partner: Not on file    Emotionally Abused: Not on file    Physically Abused: Not on file    Sexually Abused: Not on file   Housing Stability:     Unable to Pay for Housing in the Last Year: Not on file    Number of [place] in the Last Year: Not on file    Unstable Housing in the Last Year: Not on file       Family History   Problem Relation Age of Onset    Diabetes Mother     Diabetes Sister     High Blood Pressure Sister          Review of Systems:   Review of Systems   Constitutional: Negative for appetite change, chills, fatigue, fever and unexpected weight change. HENT: Negative for congestion. Respiratory: Negative for cough, chest tightness and shortness of breath. Cardiovascular: Negative for chest pain, palpitations and leg swelling. No orthopnea or PND   Gastrointestinal: Negative for abdominal distention, blood in stool, nausea and vomiting. Genitourinary: Negative for difficulty urinating and hematuria. Musculoskeletal: Negative for arthralgias and myalgias. Skin: Negative for color change, pallor and rash. Neurological: Negative for dizziness, syncope and light-headedness. Psychiatric/Behavioral: Negative for agitation and behavioral problems. Physical Examination:    BP (!) 150/100 (Site: Right Upper Arm, Position: Sitting, Cuff Size: Large Adult)   Pulse 60   Resp 18   Ht 5' 10\" (1.778 m)   Wt 159 lb (72.1 kg)   SpO2 98%   BMI 22.81 kg/m²    Physical Exam  Constitutional:       General: He is not in acute distress. Appearance: Normal appearance. HENT:      Head: Normocephalic and atraumatic. Cardiovascular:      Rate and Rhythm: Normal rate and regular rhythm. Pulmonary:      Effort: Pulmonary effort is normal. No respiratory distress. Breath sounds: No wheezing, rhonchi or rales. Abdominal:      Palpations: Abdomen is soft. Tenderness: There is no abdominal tenderness. Musculoskeletal:         General: Normal range of motion. Right lower leg: No edema. Left lower leg: No edema. Skin:     General: Skin is warm and dry. Neurological:      General: No focal deficit present. Mental Status: He is alert and oriented to person, place, and time. Cranial Nerves: No cranial nerve deficit.    Psychiatric:         Mood and Affect: Mood normal.         Behavior: Behavior normal.         LABS:  CBC:   Lab Results   Component Value Date    WBC 6.5 04/21/2022    RBC 4.66 04/21/2022    HGB 14.5 04/21/2022    HCT 44.0 04/21/2022    MCV 94.5 04/21/2022    MCH 31.1 04/21/2022    MCHC 32.9 04/21/2022    RDW 14.2 04/21/2022     04/21/2022     Lipids:  Lab Results   Component Value Date    CHOL 179 04/21/2022     Lab Results   Component Value Date    TRIG 71 04/21/2022     Lab Results   Component Value Date    HDL 69 (H) 04/21/2022     Lab Results   Component Value Date    LDLCALC 96 04/21/2022     No results found for: LABVLDL, VLDL  No results found for: CHOLHDLRATIO  CMP:    Lab Results   Component Value Date     05/18/2022    K 4.1 05/18/2022    K 3.8 04/21/2022    CL 99 05/18/2022    CO2 26 05/18/2022    BUN 18 05/18/2022    CREATININE 1.24 05/18/2022    GFRAA >60.0 05/18/2022    LABGLOM >60.0 05/18/2022    GLUCOSE 111 05/18/2022    PROT 7.3 04/19/2022    LABALBU 4.2 04/19/2022    CALCIUM 9.9 05/18/2022    BILITOT 0.5 04/19/2022    ALKPHOS 107 04/19/2022    AST 29 04/19/2022    ALT 18 04/19/2022     BMP:    Lab Results   Component Value Date     05/18/2022    K 4.1 05/18/2022    K 3.8 04/21/2022    CL 99 05/18/2022    CO2 26 05/18/2022    BUN 18 05/18/2022    LABALBU 4.2 04/19/2022    CREATININE 1.24 05/18/2022    CALCIUM 9.9 05/18/2022    GFRAA >60.0 05/18/2022    LABGLOM >60.0 05/18/2022    GLUCOSE 111 05/18/2022     Magnesium:    Lab Results   Component Value Date    MG 2.0 04/20/2022     TSH:No results found for: TSHFT4, TSH  .result  No results for input(s): PROBNP in the last 72 hours. No results for input(s): INR in the last 72 hours. Patient Active Problem List   Diagnosis    Facial abscess    STEMI (ST elevation myocardial infarction) ([facility] [address]       Assessment/Plan:     Diagnosis Orders   1. Chronic systolic CHF (congestive heart failure), NYHA class 1 (HCC)      Compensated. Continue current meds   2.  Ischemic cardiomyopathy  Echo limited    EF 20% per echo 4/20/22   3. Hypertension, unspecified type      BP elevated in office today. Add hydralazine 50g PO BID   4. CAD S/P percutaneous coronary angioplasty      s/p PCI KARUNA of mid LAD on 4/19/22. On DAPT with ASA and Brilinta. No angina   5. STEMI involving left anterior descending coronary artery ([address]      on 4/19/22   6. Dyslipidemia      Continue Lipitor 80mg PO daily   7. Former tobacco use      Continue to abstain from tobacco       -Maximize medical therapy--dual antiplatelet therapy with aspirin 81 mg p.o. daily and Brilinta 90 mg p.o. twice daily, Coreg 12.5 mg p.o. twice daily, Aldactone 25 mg p.o. daily, add hydralazine 50 mg p.o. twice daily for improved BP management, Entresto 24/26 mg p.o. twice daily, Lipitor 80 mg p.o. daily, sublingual nitroglycerin as needed for chest pain  -Heart failure compensated at this time. Patient appears euvolemic. -BMP on on 5/4/2022 and again on 5/18/2022 reviewed. Renal function electrolytes stable.  -Cardiac/<2 gram sodium diet recommended  -Recommend 1500mL fluid restriction   -Continue to abstain from tobacco and EtOH  -Instructed patient to weigh self daily every morning upon waking and keep log book of daily weights. Notify office if gaining more than 3 pounds in 48 hours. --Reeducated patient on importance of daily weights  -Recommend routine blood pressure monitoring at home.   Advised patient to check blood pressure twice daily in a.m. and p.m. prior to taking medications and record log of blood pressure trends to review at next office visit  Notify office if BP running high with  mmHg or above or DBP 85 mmHg or above  Notify office if BP running low with  mmHg or below prior to taking medications  -Advised patient to notify office immediately if experiencing any progressive SOB, orthopnea, PND, LE edema or weight gain  -Educated patient on importance of fluid and salt restriction as well as lifestyle modification  -Continue to follow with cardiac rehab as scheduled  -Maintain routine outpatient follow-up with general cardiologist, Dr. Molina Quick appointment 8/2022  **Plan to reevaluate LV function in 3 months likely with repeat echocardiogram (around July 20, 2022) and if EF remains severely reduced at less than or equal to 35%, will need referral to electrophysiology for AICD--Limited echocardiogram ordered today to be completed after 7/20/2022 to reevaluate LV function  -Advised to notify cardiology immediately with any questions, concerns or changes in his condition  -Maintain routine outpatient follow-up with PCP, Dr. Alfaro Factor  -F/U with CHF clinic in 6 weeks or sooner if needed          Counseling: The importance of daily weights, dietary sodium restriction, and contact with cardiology if weight is increased more than 3 lbs in any 48 hour period was stressed. The patient has been advised to contact us if theyexperience progressive SOB, orthopnea, PND or progressive edema.

## 2022-06-13 ENCOUNTER — TELEPHONE (OUTPATIENT)
Dept: FAMILY MEDICINE CLINIC | Age: 52
End: 2022-06-13

## 2022-06-13 NOTE — TELEPHONE ENCOUNTER
*1st attempt 6/13/2022 to reschedule the appointment on 7/22/2022 due to the provider being out of the office - No answer - LMOVM to call the office to reschedule.     *2nd attempt to reschedule the appointment - No answer - LMOVM to call the office    *3rd attempt to reschedule the appointment - No answer - LMOVM that this was the third attempt to reschedule and that the appointment was being cancelled, to please call the office to reschedule

## 2022-06-20 NOTE — TELEPHONE ENCOUNTER
Requesting medication refill. Please approve or deny this request.    Rx requested:  Requested Prescriptions     Pending Prescriptions Disp Refills    nitroGLYCERIN (NITROSTAT) 0.4 MG SL tablet [Pharmacy Med Name: NITROGLYCERIN 0.4 MG TABLET SL] 25 tablet 1     Sig: PLACE 1 TABLET UNDER THE TONGUE EVERY 5 MINUTES AS NEEDED FOR CHEST PAIN (X MAXIMUM OF 3 DOSES. IF CP UNRELIEVED AFTER 3 DOSES, CALL 911)         Last Office Visit:   6/1/2022      Next Visit Date:  Future Appointments   Date Time Provider Sury Coyne   7/13/2022 10:30 AM Vi Barajas [facility] EMERGENCY [facility]   8/24/2022  8:00 AM Marysol Quigley MD [facility]               Last refill 4/21/22. Please approve or deny.

## 2022-06-24 RX ORDER — NITROGLYCERIN 0.4 MG/1
0.4 TABLET SUBLINGUAL EVERY 5 MIN PRN
Qty: 25 TABLET | Refills: 1 | Status: SHIPPED | OUTPATIENT
Start: 2022-06-24

## 2023-01-02 ENCOUNTER — HOSPITAL ENCOUNTER (OUTPATIENT)
Age: 53
Setting detail: OBSERVATION
Discharge: HOME | End: 2023-01-02
Attending: STUDENT IN AN ORGANIZED HEALTH CARE EDUCATION/TRAINING PROGRAM | Admitting: INTERNAL MEDICINE
Payer: MEDICAID

## 2023-01-02 ENCOUNTER — TELEPHONE (OUTPATIENT)
Dept: PULMONOLOGY | Age: 53
End: 2023-01-02

## 2023-01-02 ENCOUNTER — APPOINTMENT (OUTPATIENT)
Dept: GENERAL RADIOLOGY | Age: 53
End: 2023-01-02
Payer: MEDICAID

## 2023-01-02 VITALS
HEART RATE: 66 BPM | TEMPERATURE: 98.4 F | OXYGEN SATURATION: 100 % | SYSTOLIC BLOOD PRESSURE: 171 MMHG | DIASTOLIC BLOOD PRESSURE: 95 MMHG | HEIGHT: 69 IN | WEIGHT: 150 LBS | RESPIRATION RATE: 12 BRPM | BODY MASS INDEX: 22.22 KG/M2

## 2023-01-02 DIAGNOSIS — G47.33 OSA (OBSTRUCTIVE SLEEP APNEA): Primary | ICD-10-CM

## 2023-01-02 DIAGNOSIS — I21.11 ST ELEVATION MYOCARDIAL INFARCTION INVOLVING RIGHT CORONARY ARTERY (HCC): Primary | ICD-10-CM

## 2023-01-02 PROBLEM — R07.9 CHEST PAIN: Status: ACTIVE | Noted: 2023-01-02

## 2023-01-02 LAB
ALBUMIN SERPL-MCNC: 4 G/DL (ref 3.5–4.6)
ALP BLD-CCNC: 119 U/L (ref 35–104)
ALT SERPL-CCNC: 11 U/L (ref 0–41)
ANION GAP SERPL CALCULATED.3IONS-SCNC: 6 MEQ/L (ref 9–15)
ANION GAP SERPL CALCULATED.3IONS-SCNC: 9 MEQ/L (ref 9–15)
APTT: 25.6 SEC (ref 24.4–36.8)
AST SERPL-CCNC: 19 U/L (ref 0–40)
BASOPHILS ABSOLUTE: 0.1 K/UL (ref 0–0.2)
BASOPHILS RELATIVE PERCENT: 1 %
BILIRUB SERPL-MCNC: 0.3 MG/DL (ref 0.2–0.7)
BUN BLDV-MCNC: 15 MG/DL (ref 6–20)
BUN BLDV-MCNC: 18 MG/DL (ref 6–20)
CALCIUM SERPL-MCNC: 9.1 MG/DL (ref 8.5–9.9)
CALCIUM SERPL-MCNC: 9.2 MG/DL (ref 8.5–9.9)
CHLORIDE BLD-SCNC: 103 MEQ/L (ref 95–107)
CHLORIDE BLD-SCNC: 107 MEQ/L (ref 95–107)
CO2: 29 MEQ/L (ref 20–31)
CO2: 30 MEQ/L (ref 20–31)
CREAT SERPL-MCNC: 1.03 MG/DL (ref 0.7–1.2)
CREAT SERPL-MCNC: 1.11 MG/DL (ref 0.7–1.2)
EKG ATRIAL RATE: 55 BPM
EKG P AXIS: 2 DEGREES
EKG P-R INTERVAL: 180 MS
EKG Q-T INTERVAL: 464 MS
EKG QRS DURATION: 96 MS
EKG QTC CALCULATION (BAZETT): 443 MS
EKG R AXIS: -50 DEGREES
EKG T AXIS: 107 DEGREES
EKG VENTRICULAR RATE: 55 BPM
EOSINOPHILS ABSOLUTE: 0 K/UL (ref 0–0.7)
EOSINOPHILS RELATIVE PERCENT: 0.2 %
GFR SERPL CREATININE-BSD FRML MDRD: >60 ML/MIN/{1.73_M2}
GFR SERPL CREATININE-BSD FRML MDRD: >60 ML/MIN/{1.73_M2}
GLOBULIN: 3.1 G/DL (ref 2.3–3.5)
GLUCOSE BLD-MCNC: 139 MG/DL (ref 70–99)
GLUCOSE BLD-MCNC: 81 MG/DL (ref 70–99)
HCT VFR BLD CALC: 39.5 % (ref 42–52)
HEMOGLOBIN: 12.9 G/DL (ref 14–18)
INR BLD: 1
LYMPHOCYTES ABSOLUTE: 0.6 K/UL (ref 1–4.8)
LYMPHOCYTES RELATIVE PERCENT: 10.3 %
MCH RBC QN AUTO: 31 PG (ref 27–31.3)
MCHC RBC AUTO-ENTMCNC: 32.7 % (ref 33–37)
MCV RBC AUTO: 94.8 FL (ref 79–92.2)
MONOCYTES ABSOLUTE: 0.4 K/UL (ref 0.2–0.8)
MONOCYTES RELATIVE PERCENT: 6.1 %
NEUTROPHILS ABSOLUTE: 5.1 K/UL (ref 1.4–6.5)
NEUTROPHILS RELATIVE PERCENT: 82.4 %
PDW BLD-RTO: 14.8 % (ref 11.5–14.5)
PLATELET # BLD: 191 K/UL (ref 130–400)
POTASSIUM REFLEX MAGNESIUM: 4.9 MEQ/L (ref 3.4–4.9)
POTASSIUM SERPL-SCNC: 4 MEQ/L (ref 3.4–4.9)
PRO-BNP: 403 PG/ML
PROTHROMBIN TIME: 13.3 SEC (ref 12.3–14.9)
RBC # BLD: 4.17 M/UL (ref 4.7–6.1)
SODIUM BLD-SCNC: 141 MEQ/L (ref 135–144)
SODIUM BLD-SCNC: 143 MEQ/L (ref 135–144)
TOTAL PROTEIN: 7.1 G/DL (ref 6.3–8)
TROPONIN: <0.01 NG/ML (ref 0–0.01)
WBC # BLD: 6.1 K/UL (ref 4.8–10.8)

## 2023-01-02 PROCEDURE — 83880 ASSAY OF NATRIURETIC PEPTIDE: CPT

## 2023-01-02 PROCEDURE — C1894 INTRO/SHEATH, NON-LASER: HCPCS

## 2023-01-02 PROCEDURE — 93306 TTE W/DOPPLER COMPLETE: CPT

## 2023-01-02 PROCEDURE — 85610 PROTHROMBIN TIME: CPT

## 2023-01-02 PROCEDURE — 71045 X-RAY EXAM CHEST 1 VIEW: CPT

## 2023-01-02 PROCEDURE — 99285 EMERGENCY DEPT VISIT HI MDM: CPT

## 2023-01-02 PROCEDURE — C1887 CATHETER, GUIDING: HCPCS

## 2023-01-02 PROCEDURE — 96375 TX/PRO/DX INJ NEW DRUG ADDON: CPT

## 2023-01-02 PROCEDURE — 2500000003 HC RX 250 WO HCPCS

## 2023-01-02 PROCEDURE — 6360000002 HC RX W HCPCS

## 2023-01-02 PROCEDURE — 6370000000 HC RX 637 (ALT 250 FOR IP): Performed by: INTERNAL MEDICINE

## 2023-01-02 PROCEDURE — 36415 COLL VENOUS BLD VENIPUNCTURE: CPT

## 2023-01-02 PROCEDURE — 96374 THER/PROPH/DIAG INJ IV PUSH: CPT

## 2023-01-02 PROCEDURE — 6370000000 HC RX 637 (ALT 250 FOR IP): Performed by: PERSONAL EMERGENCY RESPONSE ATTENDANT

## 2023-01-02 PROCEDURE — 84484 ASSAY OF TROPONIN QUANT: CPT

## 2023-01-02 PROCEDURE — G0378 HOSPITAL OBSERVATION PER HR: HCPCS

## 2023-01-02 PROCEDURE — 80053 COMPREHEN METABOLIC PANEL: CPT

## 2023-01-02 PROCEDURE — 6360000002 HC RX W HCPCS: Performed by: PERSONAL EMERGENCY RESPONSE ATTENDANT

## 2023-01-02 PROCEDURE — 93458 L HRT ARTERY/VENTRICLE ANGIO: CPT

## 2023-01-02 PROCEDURE — 6360000004 HC RX CONTRAST MEDICATION: Performed by: INTERNAL MEDICINE

## 2023-01-02 PROCEDURE — 99223 1ST HOSP IP/OBS HIGH 75: CPT | Performed by: INTERNAL MEDICINE

## 2023-01-02 PROCEDURE — C1769 GUIDE WIRE: HCPCS

## 2023-01-02 PROCEDURE — 85730 THROMBOPLASTIN TIME PARTIAL: CPT

## 2023-01-02 PROCEDURE — 6360000002 HC RX W HCPCS: Performed by: INTERNAL MEDICINE

## 2023-01-02 PROCEDURE — 2580000003 HC RX 258: Performed by: INTERNAL MEDICINE

## 2023-01-02 PROCEDURE — 85025 COMPLETE CBC W/AUTO DIFF WBC: CPT

## 2023-01-02 PROCEDURE — 2709999900 HC NON-CHARGEABLE SUPPLY

## 2023-01-02 PROCEDURE — 2580000003 HC RX 258: Performed by: PERSONAL EMERGENCY RESPONSE ATTENDANT

## 2023-01-02 RX ORDER — SODIUM CHLORIDE 9 MG/ML
INJECTION, SOLUTION INTRAVENOUS CONTINUOUS
Status: DISCONTINUED | OUTPATIENT
Start: 2023-01-02 | End: 2023-01-02 | Stop reason: HOSPADM

## 2023-01-02 RX ORDER — ENOXAPARIN SODIUM 100 MG/ML
40 INJECTION SUBCUTANEOUS DAILY
Status: DISCONTINUED | OUTPATIENT
Start: 2023-01-02 | End: 2023-01-02 | Stop reason: HOSPADM

## 2023-01-02 RX ORDER — CARVEDILOL 3.12 MG/1
3.12 TABLET ORAL 2 TIMES DAILY
Status: DISCONTINUED | OUTPATIENT
Start: 2023-01-02 | End: 2023-01-02 | Stop reason: HOSPADM

## 2023-01-02 RX ORDER — ASPIRIN 81 MG/1
324 TABLET, CHEWABLE ORAL ONCE
Status: DISCONTINUED | OUTPATIENT
Start: 2023-01-02 | End: 2023-01-02

## 2023-01-02 RX ORDER — HEPARIN SODIUM 1000 [USP'U]/ML
4000 INJECTION, SOLUTION INTRAVENOUS; SUBCUTANEOUS ONCE
Status: COMPLETED | OUTPATIENT
Start: 2023-01-02 | End: 2023-01-02

## 2023-01-02 RX ORDER — SODIUM CHLORIDE 0.9 % (FLUSH) 0.9 %
5-40 SYRINGE (ML) INJECTION EVERY 12 HOURS SCHEDULED
OUTPATIENT
Start: 2023-01-02

## 2023-01-02 RX ORDER — ONDANSETRON 4 MG/1
4 TABLET, ORALLY DISINTEGRATING ORAL EVERY 8 HOURS PRN
Status: DISCONTINUED | OUTPATIENT
Start: 2023-01-02 | End: 2023-01-02 | Stop reason: HOSPADM

## 2023-01-02 RX ORDER — ACETAMINOPHEN 650 MG/1
650 SUPPOSITORY RECTAL EVERY 6 HOURS PRN
Status: DISCONTINUED | OUTPATIENT
Start: 2023-01-02 | End: 2023-01-02 | Stop reason: HOSPADM

## 2023-01-02 RX ORDER — MORPHINE SULFATE 2 MG/ML
2 INJECTION, SOLUTION INTRAMUSCULAR; INTRAVENOUS
OUTPATIENT
Start: 2023-01-02 | End: 2023-01-03

## 2023-01-02 RX ORDER — POLYETHYLENE GLYCOL 3350 17 G/17G
17 POWDER, FOR SOLUTION ORAL DAILY PRN
Status: DISCONTINUED | OUTPATIENT
Start: 2023-01-02 | End: 2023-01-02 | Stop reason: HOSPADM

## 2023-01-02 RX ORDER — SODIUM CHLORIDE 0.9 % (FLUSH) 0.9 %
5-40 SYRINGE (ML) INJECTION PRN
OUTPATIENT
Start: 2023-01-02

## 2023-01-02 RX ORDER — ONDANSETRON 2 MG/ML
4 INJECTION INTRAMUSCULAR; INTRAVENOUS EVERY 6 HOURS PRN
OUTPATIENT
Start: 2023-01-02

## 2023-01-02 RX ORDER — ONDANSETRON 2 MG/ML
4 INJECTION INTRAMUSCULAR; INTRAVENOUS ONCE
Status: COMPLETED | OUTPATIENT
Start: 2023-01-02 | End: 2023-01-02

## 2023-01-02 RX ORDER — HYDRALAZINE HYDROCHLORIDE 20 MG/ML
10 INJECTION INTRAMUSCULAR; INTRAVENOUS EVERY 10 MIN PRN
OUTPATIENT
Start: 2023-01-02

## 2023-01-02 RX ORDER — LABETALOL HYDROCHLORIDE 5 MG/ML
10 INJECTION, SOLUTION INTRAVENOUS EVERY 30 MIN PRN
OUTPATIENT
Start: 2023-01-02

## 2023-01-02 RX ORDER — MORPHINE SULFATE 4 MG/ML
4 INJECTION, SOLUTION INTRAMUSCULAR; INTRAVENOUS ONCE
Status: COMPLETED | OUTPATIENT
Start: 2023-01-02 | End: 2023-01-02

## 2023-01-02 RX ORDER — SODIUM CHLORIDE 9 MG/ML
INJECTION, SOLUTION INTRAVENOUS PRN
Status: DISCONTINUED | OUTPATIENT
Start: 2023-01-02 | End: 2023-01-02 | Stop reason: HOSPADM

## 2023-01-02 RX ORDER — ONDANSETRON 2 MG/ML
4 INJECTION INTRAMUSCULAR; INTRAVENOUS EVERY 6 HOURS PRN
Status: DISCONTINUED | OUTPATIENT
Start: 2023-01-02 | End: 2023-01-02 | Stop reason: HOSPADM

## 2023-01-02 RX ORDER — ASPIRIN 81 MG/1
81 TABLET, CHEWABLE ORAL DAILY
Status: DISCONTINUED | OUTPATIENT
Start: 2023-01-03 | End: 2023-01-02 | Stop reason: HOSPADM

## 2023-01-02 RX ORDER — ATORVASTATIN CALCIUM 80 MG/1
80 TABLET, FILM COATED ORAL NIGHTLY
Status: DISCONTINUED | OUTPATIENT
Start: 2023-01-02 | End: 2023-01-02 | Stop reason: HOSPADM

## 2023-01-02 RX ORDER — ACETAMINOPHEN 325 MG/1
650 TABLET ORAL EVERY 6 HOURS PRN
Status: DISCONTINUED | OUTPATIENT
Start: 2023-01-02 | End: 2023-01-02 | Stop reason: HOSPADM

## 2023-01-02 RX ORDER — SPIRONOLACTONE 25 MG/1
25 TABLET ORAL DAILY
Status: DISCONTINUED | OUTPATIENT
Start: 2023-01-02 | End: 2023-01-02 | Stop reason: HOSPADM

## 2023-01-02 RX ORDER — MIDAZOLAM HYDROCHLORIDE 2 MG/2ML
2 INJECTION, SOLUTION INTRAMUSCULAR; INTRAVENOUS
OUTPATIENT
Start: 2023-01-02 | End: 2023-01-03

## 2023-01-02 RX ORDER — SODIUM CHLORIDE 0.9 % (FLUSH) 0.9 %
5-40 SYRINGE (ML) INJECTION EVERY 12 HOURS SCHEDULED
Status: DISCONTINUED | OUTPATIENT
Start: 2023-01-02 | End: 2023-01-02 | Stop reason: HOSPADM

## 2023-01-02 RX ORDER — SODIUM CHLORIDE 0.9 % (FLUSH) 0.9 %
5-40 SYRINGE (ML) INJECTION PRN
Status: DISCONTINUED | OUTPATIENT
Start: 2023-01-02 | End: 2023-01-02 | Stop reason: HOSPADM

## 2023-01-02 RX ORDER — FENTANYL CITRATE 50 UG/ML
25 INJECTION, SOLUTION INTRAMUSCULAR; INTRAVENOUS
OUTPATIENT
Start: 2023-01-02 | End: 2023-01-03

## 2023-01-02 RX ORDER — SODIUM CHLORIDE 9 MG/ML
INJECTION, SOLUTION INTRAVENOUS PRN
OUTPATIENT
Start: 2023-01-02

## 2023-01-02 RX ORDER — SODIUM CHLORIDE 9 MG/ML
INJECTION, SOLUTION INTRAVENOUS CONTINUOUS
OUTPATIENT
Start: 2023-01-02

## 2023-01-02 RX ORDER — PANTOPRAZOLE SODIUM 40 MG/1
40 TABLET, DELAYED RELEASE ORAL
Status: DISCONTINUED | OUTPATIENT
Start: 2023-01-03 | End: 2023-01-02 | Stop reason: HOSPADM

## 2023-01-02 RX ORDER — ASPIRIN 81 MG/1
324 TABLET, CHEWABLE ORAL ONCE
Status: COMPLETED | OUTPATIENT
Start: 2023-01-02 | End: 2023-01-02

## 2023-01-02 RX ORDER — ACETAMINOPHEN 325 MG/1
650 TABLET ORAL EVERY 4 HOURS PRN
OUTPATIENT
Start: 2023-01-02

## 2023-01-02 RX ORDER — IPRATROPIUM BROMIDE AND ALBUTEROL SULFATE 2.5; .5 MG/3ML; MG/3ML
1 SOLUTION RESPIRATORY (INHALATION) CONTINUOUS PRN
Status: DISCONTINUED | OUTPATIENT
Start: 2023-01-02 | End: 2023-01-02

## 2023-01-02 RX ADMIN — ONDANSETRON 4 MG: 2 INJECTION INTRAMUSCULAR; INTRAVENOUS at 00:48

## 2023-01-02 RX ADMIN — CARVEDILOL 3.12 MG: 3.12 TABLET, FILM COATED ORAL at 07:40

## 2023-01-02 RX ADMIN — Medication 10 ML: at 07:53

## 2023-01-02 RX ADMIN — MORPHINE SULFATE 4 MG: 4 INJECTION INTRAVENOUS at 00:48

## 2023-01-02 RX ADMIN — IOPAMIDOL 77 ML: 612 INJECTION, SOLUTION INTRAVENOUS at 13:45

## 2023-01-02 RX ADMIN — HEPARIN SODIUM 4000 UNITS: 1000 INJECTION INTRAVENOUS; SUBCUTANEOUS at 00:53

## 2023-01-02 RX ADMIN — TICAGRELOR 90 MG: 90 TABLET ORAL at 11:57

## 2023-01-02 RX ADMIN — SACUBITRIL AND VALSARTAN 1 TABLET: 24; 26 TABLET, FILM COATED ORAL at 07:40

## 2023-01-02 RX ADMIN — SPIRONOLACTONE 25 MG: 25 TABLET ORAL at 07:40

## 2023-01-02 RX ADMIN — ENOXAPARIN SODIUM 40 MG: 100 INJECTION SUBCUTANEOUS at 07:40

## 2023-01-02 RX ADMIN — ASPIRIN 324 MG: 81 TABLET, CHEWABLE ORAL at 00:40

## 2023-01-02 RX ADMIN — SODIUM CHLORIDE: 9 INJECTION, SOLUTION INTRAVENOUS at 00:50

## 2023-01-02 RX ADMIN — TICAGRELOR 180 MG: 90 TABLET ORAL at 00:48

## 2023-01-02 ASSESSMENT — ENCOUNTER SYMPTOMS
ABDOMINAL PAIN: 0
SORE THROAT: 0
VOMITING: 0
DIARRHEA: 0
RHINORRHEA: 0
COUGH: 0
SHORTNESS OF BREATH: 0
BLOOD IN STOOL: 0
NAUSEA: 0
COLOR CHANGE: 0

## 2023-01-02 ASSESSMENT — PAIN DESCRIPTION - PAIN TYPE: TYPE: ACUTE PAIN

## 2023-01-02 ASSESSMENT — PAIN DESCRIPTION - LOCATION
LOCATION: CHEST
LOCATION: CHEST

## 2023-01-02 ASSESSMENT — PAIN DESCRIPTION - DESCRIPTORS: DESCRIPTORS: SHARP

## 2023-01-02 ASSESSMENT — PAIN - FUNCTIONAL ASSESSMENT
PAIN_FUNCTIONAL_ASSESSMENT: 0-10
PAIN_FUNCTIONAL_ASSESSMENT: 0-10

## 2023-01-02 ASSESSMENT — PAIN SCALES - GENERAL
PAINLEVEL_OUTOF10: 7
PAINLEVEL_OUTOF10: 7

## 2023-01-02 ASSESSMENT — LIFESTYLE VARIABLES: HOW OFTEN DO YOU HAVE A DRINK CONTAINING ALCOHOL: NEVER

## 2023-01-02 NOTE — ED NOTES
EKG OBTAINED  SHOWING ACUTE STEMI  GIVEN TO [address] [place]  DR [address] [place] [address] A CODE PURPLE UNTIL HE SPEAKS WITH DR Kallie Brunner DR [place], RN  01/02/23 0044

## 2023-01-02 NOTE — CONSULTS
Critical Care Consult           Admit Date: 1/2/2023    PCP:  Gladis Morris MD       CHIEF COMPLAINT / HPI:                The patient is a 50 y.o. male with significant past medical history of coronary artery disease, hypertension and hyperlipidemia who presented with chest pain. He was found to have ST elevation in the anterior septal leads. He was taken to Cath lab and underwent catheterization. Apparently, he had patent left main, patent LAD stent and patent circumflex stent. RCA with severe disease. He was admitted to the ICU for further medical management. Has had no chest pain over the last few hours. His blood pressure currently is on the higher end. He denies any shortness of breath. He has a history of asthma but has not been using any inhalers recently. Past Medical History:      Diagnosis Date    ADHD (attention deficit hyperactivity disorder)     Anticoagulant long-term use     brilenta and aspirin    Asthma     CAD (coronary artery disease)     Hyperlipidemia     Hypertension     MI (myocardial infarction) ([facility] [address]     Substance abuse ([facility] [address]     history of smoking marijuana 4/18/2022 last time. Past Surgical History:        Procedure Laterality Date    CORONARY ANGIOPLASTY WITH STENT PLACEMENT      LIP SURGERY      1985    MOUTH SURGERY         Current Medications:     sodium chloride flush  5-40 mL IntraVENous 2 times per day    [START ON 1/3/2023] aspirin  81 mg Oral Daily    atorvastatin  80 mg Oral Nightly    enoxaparin  40 mg SubCUTAneous Daily    carvedilol  3.125 mg Oral BID    sacubitril-valsartan  1 tablet Oral BID    ticagrelor  90 mg Oral BID    spironolactone  25 mg Oral Daily     Home Meds:  Prior to Admission medications    Medication Sig Start Date End Date Taking? Authorizing Provider   nitroGLYCERIN (NITROSTAT) 0.4 MG SL tablet PLACE 1 TABLET UNDER THE TONGUE EVERY 5 MINUTES AS NEEDED FOR CHEST PAIN (X MAXIMUM OF 3 DOSES.  IF CP UNRELIEVED AFTER 3 DOSES, CALL 911) 6/24/22   Gabe Perez MD   hydrALAZINE (APRESOLINE) 50 MG tablet Take 1 tablet by mouth 2 times daily 6/1/22   FLAKITO Espinosa   sacubitril-valsartan (ENTRESTO) 24-26 MG per tablet Take 1 tablet by mouth 2 times daily 5/18/22   Gabe Perez MD   carvedilol (COREG) 12.5 MG tablet Take 1 tablet by mouth 2 times daily (with meals) 5/18/22   Gabe Perez MD   spironolactone (ALDACTONE) 25 MG tablet Take 1 tablet by mouth daily 5/18/22   Gabe Perez MD   atorvastatin (LIPITOR) 80 MG tablet Take 1 tablet by mouth nightly 5/18/22   Gabe Perez MD   aspirin 81 MG chewable tablet Take 1 tablet by mouth daily 5/18/22   Gabe Perez MD   triamcinolone (KENALOG) 0.1 % cream Apply topically 2 times daily for 1 week. 5/15/22   Velma Amaral MD   ticagrelor (BRILINTA) 90 MG TABS tablet Take 1 tablet by mouth 2 times daily 4/21/22   FLAKITO Espinosa   pantoprazole (PROTONIX) 40 MG tablet Take 1 tablet by mouth every morning (before breakfast) 4/22/22   Gabe Perez MD       Allergies:  Patient has no known allergies. Social History:      reports that he quit smoking about 8 months ago. His smoking use included cigarettes. He has a 15.00 pack-year smoking history. He has never used smokeless tobacco. He reports current alcohol use. He reports that he does not currently use drugs after having used the following drugs: Marijuana Chelsey Cong). TOBACCO:   reports that he quit smoking about 8 months ago. His smoking use included cigarettes. He has a 15.00 pack-year smoking history. He has never used smokeless tobacco.     ETOH:   reports current alcohol use. Family History:   family history includes Diabetes in his mother and sister; High Blood Pressure in his sister. Review of Systems  Complete review of systems done and negative unless otherwise noted positive.        Objective:     PHYSICAL EXAM:      VITALS:  BP (!) 171/95   Pulse 58   Temp 98.4 °F (36.9 °C) (Oral)   Resp 12   Ht 5' 9\" (1.753 m)   Wt 150 lb (68 kg)   SpO2 100%   BMI 22.15 kg/m²   24HR INTAKE/OUTPUT:  No intake or output data in the 24 hours ending 23 0839  CURRENT PULSE OXIMETRY:  SpO2: 100 %  24HR PULSE OXIMETRY RANGE:  SpO2  Av.5 %  Min: 84 %  Max: 100 %    General appearance - alert, ill appearing, and in no distress  Mental status - alert, oriented to person, place, and time  Eyes - pupils equal and reactive, extraocular eye movements intact  Nose - normal and patent, no erythema, discharge or polyps  Neck - supple, no significant adenopathy  Chest - clear to auscultation, no wheezes, rales or rhonchi, symmetric air entry  Heart - normal rate, regular rhythm, normal S1, S2, no murmurs, rubs, clicks or gallops  Abdomen - soft, nontender, nondistended, no masses or organomegaly  Rectal - deferred, not clinically indicated  Neurological - alert, oriented, normal speech, no focal findings or movement disorder noted, motor and sensory grossly normal bilaterally  Musculoskeletal - no joint tenderness, deformity or swelling  Extremities - peripheral pulses normal, no pedal edema, no clubbing or cyanosis  Skin - normal coloration and turgor, no rashes, no suspicious skin lesions noted         DATA:    CBC:   Recent Labs     23   WBC 6.1   HGB 12.9*   HCT 39.5*        BMP:    Recent Labs     23  0633    143   K 4.0 4.9    107   CO2 29 30   BUN 15 18   CREATININE 1.03 1.11   GLUCOSE 139* 81   CALCIUM 9.2 9.1     HEPATIC:   Recent Labs     23  0045   AST 19   ALT 11   BILITOT 0.3   ALKPHOS 119*        Recent Labs     23  0045   TROPONINI <0.010      Recent Labs     23  0059   INR 1.0          Radiology Review:    CXR portable: Results for orders placed during the hospital encounter of 23    XR CHEST PORTABLE    Narrative  EXAMINATION:  ONE XRAY VIEW OF THE CHEST    2023 12:54 am    COMPARISON:  2022    HISTORY:  ORDERING SYSTEM PROVIDED HISTORY: sob  TECHNOLOGIST PROVIDED HISTORY:  Reason for exam:->sob  What reading provider will be dictating this exam?->CRC    FINDINGS:  The lungs are without acute focal process. There is no effusion or  pneumothorax. The cardiomediastinal silhouette is without acute process. The  osseous structures are without acute process. Impression  No acute process. Echo:    Assessment/Plan           Impression:    -Acute coronary syndrome. Status post cardiac cath. Did not require any intervention.  -Accelerated hypertension. Systolic blood pressure in the 744B and diastolic blood pressure is 115.  -Coronary artery disease status post PCI in April 2022. -Asthma. Does not seem to be in exacerbation.  -Hyperlipidemia.  -Suspect sleep disordered breathing. Recommendations:    -Admitted to the ICU for hemodynamic and airway monitoring. Did well overnight. Continues to be on cardiac monitor.  -Continue cardiac medications. Patient evaluated by cardiology. -Adjust blood pressure medications. Will need aggressive blood pressure control.  -We will need testing for sleep disordered breathing as an outpatient.  -Continue statins.  -Can be transferred out of the ICU today.         Full Code       Electronically signed by Dino Tristan MD on 1/2/2023 at 8:39 AM

## 2023-01-02 NOTE — FLOWSHEET NOTE
Patient awake, eating independently in the room, states that he has no cp at this time. Talking on the phone, no distress noted, Right radial site is soft and nontender.  Order for transfer to [address] [place] [address]

## 2023-01-02 NOTE — H&P
Cardiology history and physical note    Patient Name: Arsen Nielson  Admit Date: 2023 12:27 AM  MR #: [id number]  : 1970    Attending Physician: William Vasquez MD  Reason for Admission: Chest pain    History of Presenting Illness:    Arsen Nielson is a 52 y.o. male on hospital day 0 who follows up with me in clinic with a history of CAD status post STEMI on 2022 successful PCI of mid LAD placement of 4 x 28 KARUNA and 3.5 x 12 mm KARUNA.Severe ischemic cardiomyopathy EF 20%,  Hypertension, Hyperlipidemia, Smoker and medication noncompliant comes to the hospital for sudden onset of chest pain  EKG showing ST elevations in the anteroseptal leads    Patient was brought to the Cath Lab as a code purple  Patient was found with patent left main, patent LAD stent, patent circumflex stent  RCA nondominant severely diffuse disease    Patient will be admitted to ICU for recovery and subsequently sent back to telemetry to continue management    History Obtained From:  patient, electronic medical record  History:      Past Medical History:   Diagnosis Date    ADHD (attention deficit hyperactivity disorder)     Anticoagulant long-term use     brilenta and aspirin    Asthma     CAD (coronary artery disease)     Hyperlipidemia     Hypertension     MI (myocardial infarction) ([facility])     Substance abuse ([facility])     history of smoking marijuana 2022 last time.      Past Surgical History:   Procedure Laterality Date    CORONARY ANGIOPLASTY WITH STENT PLACEMENT      LIP SURGERY      1985    MOUTH SURGERY       Family History  Family History   Problem Relation Age of Onset    Diabetes Mother     Diabetes Sister     High Blood Pressure Sister      Social History     Socioeconomic History    Marital status: Single     Spouse name: Not on file    Number of children: Not on file    Years of education: Not on file    Highest education level: Not on file   Occupational History    Not on file   Tobacco Use    Smoking  status: Former     Packs/day: 0.50     Years: 30.00     Pack years: 15.00     Types: Cigarettes     Quit date: 2022     Years since quittin.7    Smokeless tobacco: Never   Vaping Use    Vaping Use: Never used   Substance and Sexual Activity    Alcohol use: Yes     Comment: beer occassionally, last 2022    Drug use: Not Currently     Types: Marijuana Zamora Radha)     Comment: last use 2022    Sexual activity: Not Currently     Partners: Female   Other Topics Concern    Not on file   Social History Narrative    Not on file     Social Determinants of Health     Financial Resource Strain: Low Risk     Difficulty of Paying Living Expenses: Not hard at all   Food Insecurity: No Food Insecurity    Worried About Running Out of Food in the Last Year: Never true    Ran Out of Food in the Last Year: Never true   Transportation Needs: Not on file   Physical Activity: Not on file   Stress: Not on file   Social Connections: Not on file   Intimate Partner Violence: Not on file   Housing Stability: Not on file      Home Medications:      Not in a hospital admission. Current Hospital Medications:   Scheduled Meds:  Continuous Infusions:   sodium chloride 75 mL/hr at 23 0050     PRN Meds:.   sodium chloride 75 mL/hr at 23 0050      Allergies:   No Known Allergies   Review of Systems:       [x] CV, Resp, Neuro, , and all other systems reviewed and negative other than listed in HPI. Objective Findings:     Vitals:   Vitals:    23 0036 23 0059 23 0100   BP: (!) 162/91 (!) 148/97 (!) 140/92   Pulse: 69 59 58   Resp: 18 14 14   Temp: 98 °F (36.7 °C)     TempSrc: Oral     SpO2: 100% 98% 98%   Weight: 150 lb (68 kg)     Height: 5' 9\" (1.753 m)          Physical Examination:  General: Alert oriented x4 not acute distress  HEENT: Normocephalic, no scleral icterus. Neck: No JVD. Heart: Regular, no murmur, no rub/gallop. No RV heave. Lungs: Clear to ascultation, no rales/wheezing/rhonchi. Good chest wall excursion.  Abdomen: Soft nontender nondistended  Extremities: No clubbing/cyanosis, no edema.   Skin: Warm, dry, normal turgor, no rash, no bruise, no petichiae.  Neuro: No myoclonus or tremor.   Psych: Normal affect    Results/ Medications reviewed 1/2/2023, 1:56 AM     Laboratory, Microbiology, Pathology, Radiology, Cardiology, Medications and Transcriptions reviewed  Scheduled Meds:  Continuous Infusions:   sodium chloride 75 mL/hr at 01/02/23 0050       Recent Labs     01/02/23  0045   WBC 6.1   HGB 12.9*   HCT 39.5*   MCV 94.8*        Recent Labs     01/02/23  0045      K 4.0      CO2 29   BUN 15   CREATININE 1.03     Recent Labs     01/02/23 0045 01/02/23 0059   PROT 7.1  --    INR  --  1.0     No results found for this or any previous visit.   Impression:   Chest pain.  Patient was brought to the Cath Lab 1/2/2023 as a code purple.  Left main LAD and circumflex widely patent, RCA nondominant severely diffuse disease  CAD status post STEMI on 4/19/2022 successful PCI of mid LAD placement of 4 x 28 KARUNA and 3.5 x 12 mm KARUNA.  Severe ischemic cardiomyopathy EF 20%  Hypertension  Hyperlipidemia  Smoker  Medication noncompliant  Plan:   Transfer patient back to ICU for post diagnostic cath management  Maximize medical therapy   Continue aspirin and high intensity statin indefinitely for secondary prevention of CAD  Continue Brilinta 90 mg p.o. twice daily  Continue beta-blocker and ACE inhibitor  Remove TR band after 1.5 hours  IV hydration at 75 cc/h to complete 1 L then TKO  Follow-up morning labs including CBC BMP  Obtain an echocardiogram  Comments:     Please call if questions or concerns arise.    Electronically signed by Sabas English MD on 1/2/2023 at 1:56 AM    Please note this report has been partially produced using speech recognition software and may cause contain errors related to that system including grammar, punctuation and spelling as well as words and phrases  that may seem inappropriate. If there are questions or concerns please feel free to contact me to clarify.

## 2023-01-02 NOTE — PROGRESS NOTES
Pt arrived from the cath lab at approximately 0200. Assessment completed, see flow sheets. Radial band removed per orders, 2 mls of air removed every 15 mins. Band removal completed at 0400, no s/s of hematoma noted. Site assessed one hr later, no s/s of hematoma noted. Pt instructed not to move or use wrist and to notify nurse if any pain or bleeding starts. Pt understands instructions. No s/s of distress noted, safety measures remained in place throughout shift. Handoff report given to oncoming RN.

## 2023-01-02 NOTE — TELEPHONE ENCOUNTER
I am ordering home sleep study. Please have the patient follow-up with me only after sleep study is done.   Thanks

## 2023-01-02 NOTE — CARE COORDINATION
PT HERE AS OBS NEEDS INPATIENT ORDER . CMI DONE LAST April PT [address]. HE HAS DR. GARCIA LISTED AS PCP NOW. HE STATES HE WAS OUT OF HIS MEDS A FEW MONTHS. [facility] [place] Case Management Initial Discharge Assessment     Met with Patient to discuss discharge plan. PCP: Does not have one                                Date of Last Visit: N/A     [facility] Patient: No        [facility] Notified: no     If no PCP, list provided? Yes     Discharge Planning     Living Arrangements: independently at home     Who do you live with? Patient said he lives with a friend     Who helps you with your care:  self     If lives at home:                Do you have any barriers navigating in your home? no     Patient can perform ADL? Yes     Current Services (outpatient and in home) :  None     Dialysis: No     Is transportation available to get to your appointments? Yes-Patient said his friend drives him to appointment     DME Equipment:  no     Respiratory equipment: None     Respiratory provider:  no     Pharmacy:  yes [facility] [address] [place] [address],[address] with Medication Assistance Program?  No       Patient agreeable to [facility]? Declined     Patient agreeable to SNF/Rehab? Declined     Other discharge needs identified? Cardiac Rehab     Does Patient Have a High-Risk for Readmission Diagnosis (CHF, PN, MI, COPD)? No     Initial Discharge Plan? (Note: please see concurrent daily documentation for any updates after initial note). Patient denied need for [facility] or SNF. He said he is agreeable to cardiac rehab.       Readmission Risk              Risk of Unplanned Readmission:  10

## 2023-01-02 NOTE — ED TRIAGE NOTES
PT STATES HE HAS HAD CHEST PAIN SINCE 9PM TODAY STATES HE COULD NOT TAKE THE PAIN ANYMORE SO HE CAME TO THE ED    PT STATES THEPAIN IS CONSTANT AND THAT NOTHING RELIEFS THE PAIN   PT STATES HE TOOK A NITRO AT HOME BEFORE HE CAME WITH NO RELIEF     HE DOES HAVE A CARDIAC HISTORY BUT STATES THAT HE IS OUT OF A LOT OF HIS MEDICATIONS     PT IS STABLE ON ED COT BREATHING EQUAL AND UNLABORED     PT DENIES ANY SOB     FLAKITO [address]

## 2023-01-02 NOTE — ED PROVIDER NOTES
[facility] [facility] ED  eMERGENCY dEPARTMENT eNCOUnter      Pt Name: Susan Murrieta  MRN: [id number]  [place] 1970  Date of evaluation: 1/2/2023  Provider: FLAKITO Castillo      HISTORY OF PRESENT ILLNESS    Susan Murrieta is a 50 y.o. male with PMHx of ADHD, asthma, CAD, hyperlipidemia, hypertension, MI presents to the emergency department with chest pain. Patient states at 9 PM while at rest he started with chest pain mostly left anterior chest but also radiating to the right. Symptoms have been constant, unable to describe. Took 1 nitro without relief. He has been out of all his medications for the past couple months. He denies fevers, lightheaded/dizziness, cough, shortness of breath, abdominal pain, nausea, vomiting. States this feels similar to his previous MI. Smoking black and milds. No drugs or ETOH. HPI    Nursing Notes were reviewed. REVIEW OF SYSTEMS       Review of Systems   Constitutional:  Negative for appetite change, chills and fever. HENT:  Negative for congestion, rhinorrhea and sore throat. Respiratory:  Negative for cough and shortness of breath. Cardiovascular:  Positive for chest pain. Gastrointestinal:  Negative for abdominal pain, blood in stool, diarrhea, nausea and vomiting. Genitourinary:  Negative for difficulty urinating. Musculoskeletal:  Negative for neck stiffness. Skin:  Negative for color change and rash. Neurological:  Negative for dizziness, syncope, weakness, light-headedness, numbness and headaches. All other systems reviewed and are negative. PAST MEDICAL HISTORY     Past Medical History:   Diagnosis Date    ADHD (attention deficit hyperactivity disorder)     Anticoagulant long-term use     brilenta and aspirin    Asthma     CAD (coronary artery disease)     Hyperlipidemia     Hypertension     MI (myocardial infarction) ([facility] [address]     Substance abuse ([facility] [address]     history of smoking marijuana 4/18/2022 last time.           SURGICAL HISTORY       Past Surgical History:   Procedure Laterality Date    CORONARY ANGIOPLASTY WITH STENT PLACEMENT      LIP SURGERY      1985    MOUTH SURGERY           CURRENT MEDICATIONS       Previous Medications    ASPIRIN 81 MG CHEWABLE TABLET    Take 1 tablet by mouth daily    ATORVASTATIN (LIPITOR) 80 MG TABLET    Take 1 tablet by mouth nightly    CARVEDILOL (COREG) 12.5 MG TABLET    Take 1 tablet by mouth 2 times daily (with meals)    HYDRALAZINE (APRESOLINE) 50 MG TABLET    Take 1 tablet by mouth 2 times daily    NITROGLYCERIN (NITROSTAT) 0.4 MG SL TABLET    PLACE 1 TABLET UNDER THE TONGUE EVERY 5 MINUTES AS NEEDED FOR CHEST PAIN (X MAXIMUM OF 3 DOSES. IF CP UNRELIEVED AFTER 3 DOSES, CALL 911)    PANTOPRAZOLE (PROTONIX) 40 MG TABLET    Take 1 tablet by mouth every morning (before breakfast)    SACUBITRIL-VALSARTAN (ENTRESTO) 24-26 MG PER TABLET    Take 1 tablet by mouth 2 times daily    SPIRONOLACTONE (ALDACTONE) 25 MG TABLET    Take 1 tablet by mouth daily    TICAGRELOR (BRILINTA) 90 MG TABS TABLET    Take 1 tablet by mouth 2 times daily    TRIAMCINOLONE (KENALOG) 0.1 % CREAM    Apply topically 2 times daily for 1 week. ALLERGIES     Patient has no known allergies.     FAMILY HISTORY       Family History   Problem Relation Age of Onset    Diabetes Mother     Diabetes Sister     High Blood Pressure Sister           SOCIAL HISTORY       Social History     Socioeconomic History    Marital status: Single     Spouse name: None    Number of children: None    Years of education: None    Highest education level: None   Tobacco Use    Smoking status: Former     Packs/day: 0.50     Years: 30.00     Pack years: 15.00     Types: Cigarettes     Quit date: 2022     Years since quittin.7    Smokeless tobacco: Never   Vaping Use    Vaping Use: Never used   Substance and Sexual Activity    Alcohol use: Yes     Comment: beer occassionally, last 2022    Drug use: Not Currently     Types: Marijuana Maurita Babita) Comment: last use 4/18/2022    Sexual activity: Not Currently     Partners: Female     Social Determinants of Health     Financial Resource Strain: Low Risk     Difficulty of Paying Living Expenses: Not hard at all   Food Insecurity: No Food Insecurity    Worried About [address] in the Last Year: Never true    Ran Out of Food in the Last Year: Never true         PHYSICAL EXAM         ED Triage Vitals [01/02/23 0036]   BP Temp Temp Source Heart Rate Resp SpO2 Height Weight   (!) 162/91 98 °F (36.7 °C) Oral 69 18 100 % 5' 9\" (1.753 m) 150 lb (68 kg)       Physical Exam  Constitutional:       Appearance: He is well-developed. HENT:      Head: Normocephalic and atraumatic. Eyes:      Conjunctiva/sclera: Conjunctivae normal.      Pupils: Pupils are equal, round, and reactive to light. Neck:      Trachea: No tracheal deviation. Cardiovascular:      Heart sounds: Normal heart sounds. Pulmonary:      Effort: Pulmonary effort is normal. No respiratory distress. Breath sounds: No stridor. Wheezing present. Comments: Inspiratory and expiratory wheezing, no labored respirations  Abdominal:      General: Bowel sounds are normal. There is no distension. Palpations: Abdomen is soft. There is no mass. Tenderness: There is no abdominal tenderness. There is no guarding or rebound. Musculoskeletal:         General: Normal range of motion. Cervical back: Normal range of motion and neck supple. Skin:     General: Skin is warm and dry. Capillary Refill: Capillary refill takes less than 2 seconds. Findings: No rash. Neurological:      Mental Status: He is alert and oriented to person, place, and time. Deep Tendon Reflexes: Reflexes are normal and symmetric. Psychiatric:         Behavior: Behavior normal.         Thought Content:  Thought content normal.         Judgment: Judgment normal.       DIAGNOSTIC RESULTS     EKG:All EKG's are interpreted by the Emergency Department Physician who either signs or Co-signs this chart in the absence of a cardiologist.    EKG shows ST elevations in inferior leads, rate 55, normal intervals    RADIOLOGY:   Non-plain film images such as CT, Ultrasound and MRI are read by theradiologist. Plain radiographic images are visualized and preliminarily interpreted by the emergency physician with the below findings:    Interpretation per theRadiologist below, if available at the time of this note:    XR CHEST PORTABLE    (Results Pending)           LABS:  Labs Reviewed   CBC WITH AUTO DIFFERENTIAL - Abnormal; Notable for the following components:       Result Value    RBC 4.17 (*)     Hemoglobin 12.9 (*)     Hematocrit 39.5 (*)     MCV 94.8 (*)     MCHC 32.7 (*)     RDW 14.8 (*)     Lymphocytes Absolute 0.6 (*)     All other components within normal limits   COMPREHENSIVE METABOLIC PANEL   TROPONIN   BRAIN NATRIURETIC PEPTIDE   PROTIME-INR   APTT       All other labs were within normal range or not returned as of this dictation. EMERGENCY DEPARTMENT COURSE and DIFFERENTIAL DIAGNOSIS/MDM:   Vitals:    Vitals:    01/02/23 0036   BP: (!) 162/91   Pulse: 69   Resp: 18   Temp: 98 °F (36.7 °C)   TempSrc: Oral   SpO2: 100%   Weight: 150 lb (68 kg)   Height: 5' 9\" (1.753 m)     ED Course as of 01/02/23 0750   Mon Jan 02, 2023   0059 EKG 12 Lead  EKG showing sinus bradycardia, rate of 55 bpm.  Left axis deviation. Normal intervals. Possible see elevation in 2 3 and aVF with likely very mild ST depression in 1 and aVL. Also noting biphasic T waves in V2 and T wave inversions in V5 and V6. Compared to prior EKG obtained after prior STEMI. Noting similar T wave inversions in 1 and aVL. EKG largely changed however, no persistence of LBBB. Given minimal changes, though presentation concerning for ST elevation with patient complaining of chest pain similar to prior STEMI, immediately consulted interventional cardiology.   Dr. Johnny Mims was understanding the patient's condition in the ED and reviewed the EKGs. Recommended calling code purple and taking patient to the Cath Lab. Protocols therefore initiated in the ED. Will be admitted to the Cath Lab at the first available time. Aspirin, heparin, Brilinta already initiated in the ED. [NA]      ED Course User Index  [NA] MD RADHA Cooper     Amount and/or Complexity of Data Reviewed  Clinical lab tests: reviewed  Tests in the radiology section of CPT®: reviewed      Cardiac cath April 2022: PCI of mid LAD 99%. LMCA had mild disease in RCA showed small size vessel nondominant proximal to mid segment with severe stenosis. Cardiac Echo April 2022: LVEF 20%. EKG obtained which shows STEMI of inferior leads. Code purple called, spoke to Dr. Galilea Blackman. Patient given aspirin, heparin, Brilinta, Zofran, morphine and started on gradual IV fluids. Chest x-ray interpreted by radiologist showing no acute process. . Troponin negative at this time. Patient transferred to Cath Lab. CRITICAL CARE TIME   Total Critical Caretime was 35 minutes, excluding separately reportable procedures. There was a high probability of clinically significant/life threatening deterioration in the patient's condition which required my urgent intervention. Procedures    FINAL IMPRESSION      1. ST elevation myocardial infarction involving right coronary artery [facility])          DISPOSITION/PLAN   DISPOSITION Decision To Admit 01/02/2023 12:46:11 AM      PATIENT REFERRED TO:  No follow-up provider specified. DISCHARGE MEDICATIONS:  New Prescriptions    No medications on file          (Please notethat portions of this note were completed with a voice recognition program.  Efforts were made to edit the dictations but occasionally words are mis-transcribed. )    FLAKITO Elaine (electronically signed)  Emergency Physician Assistant         Mariano Richardson, [id number]  01/02/23 FLAKITO Michelle  01/02/23 Maryjane Fink MD  01/02/23 3337

## 2023-01-03 ENCOUNTER — TELEPHONE (OUTPATIENT)
Dept: FAMILY MEDICINE CLINIC | Age: 53
End: 2023-01-03

## 2023-01-03 NOTE — TELEPHONE ENCOUNTER
Care Transitions Initial Follow Up Call    Outreach made within 2 business days of discharge: Yes    Patient: Alesha Leonardo Patient : 1970   MRN: [id number]  Reason for Admission: There are no discharge diagnoses documented for the most recent discharge. Discharge Date: 23       Spoke with: Pt was called, left message to call back     Discharge department/facility: ICU  Scheduled appointment with PCP within 7-14 days    Follow Up  No future appointments.     Jean Pierre Mendez MA

## 2023-01-04 ENCOUNTER — HOSPITAL ENCOUNTER (EMERGENCY)
Age: 53
Discharge: HOME | End: 2023-01-04
Payer: MEDICAID

## 2023-01-04 VITALS
HEART RATE: 74 BPM | DIASTOLIC BLOOD PRESSURE: 99 MMHG | RESPIRATION RATE: 12 BRPM | HEIGHT: 69 IN | TEMPERATURE: 97.7 F | SYSTOLIC BLOOD PRESSURE: 148 MMHG | BODY MASS INDEX: 22.36 KG/M2 | WEIGHT: 151 LBS | OXYGEN SATURATION: 96 %

## 2023-01-04 DIAGNOSIS — I16.0 HYPERTENSIVE URGENCY: ICD-10-CM

## 2023-01-04 DIAGNOSIS — L20.82 FLEXURAL ECZEMA: Primary | ICD-10-CM

## 2023-01-04 LAB
ALBUMIN SERPL-MCNC: 4.1 G/DL (ref 3.5–4.6)
ALP BLD-CCNC: 122 U/L (ref 35–104)
ALT SERPL-CCNC: 12 U/L (ref 0–41)
ANION GAP SERPL CALCULATED.3IONS-SCNC: 8 MEQ/L (ref 9–15)
AST SERPL-CCNC: 24 U/L (ref 0–40)
BASOPHILS ABSOLUTE: 0.1 K/UL (ref 0–0.2)
BASOPHILS RELATIVE PERCENT: 1.2 %
BILIRUB SERPL-MCNC: 1.1 MG/DL (ref 0.2–0.7)
BUN BLDV-MCNC: 12 MG/DL (ref 6–20)
CALCIUM SERPL-MCNC: 9.1 MG/DL (ref 8.5–9.9)
CHLORIDE BLD-SCNC: 101 MEQ/L (ref 95–107)
CO2: 28 MEQ/L (ref 20–31)
CREAT SERPL-MCNC: 0.9 MG/DL (ref 0.7–1.2)
EOSINOPHILS ABSOLUTE: 0.1 K/UL (ref 0–0.7)
EOSINOPHILS RELATIVE PERCENT: 1.6 %
GFR SERPL CREATININE-BSD FRML MDRD: >60 ML/MIN/{1.73_M2}
GLOBULIN: 2.9 G/DL (ref 2.3–3.5)
GLUCOSE BLD-MCNC: 80 MG/DL (ref 70–99)
HCT VFR BLD CALC: 40 % (ref 42–52)
HEMOGLOBIN: 13.1 G/DL (ref 14–18)
LYMPHOCYTES ABSOLUTE: 2 K/UL (ref 1–4.8)
LYMPHOCYTES RELATIVE PERCENT: 30.2 %
MCH RBC QN AUTO: 31 PG (ref 27–31.3)
MCHC RBC AUTO-ENTMCNC: 32.6 % (ref 33–37)
MCV RBC AUTO: 94.9 FL (ref 79–92.2)
MONOCYTES ABSOLUTE: 0.8 K/UL (ref 0.2–0.8)
MONOCYTES RELATIVE PERCENT: 12 %
NEUTROPHILS ABSOLUTE: 3.6 K/UL (ref 1.4–6.5)
NEUTROPHILS RELATIVE PERCENT: 55 %
PDW BLD-RTO: 14.8 % (ref 11.5–14.5)
PLATELET # BLD: 216 K/UL (ref 130–400)
POTASSIUM SERPL-SCNC: 3.9 MEQ/L (ref 3.4–4.9)
RBC # BLD: 4.22 M/UL (ref 4.7–6.1)
SODIUM BLD-SCNC: 137 MEQ/L (ref 135–144)
TOTAL PROTEIN: 7 G/DL (ref 6.3–8)
WBC # BLD: 6.6 K/UL (ref 4.8–10.8)

## 2023-01-04 PROCEDURE — 96376 TX/PRO/DX INJ SAME DRUG ADON: CPT

## 2023-01-04 PROCEDURE — 36415 COLL VENOUS BLD VENIPUNCTURE: CPT

## 2023-01-04 PROCEDURE — 6360000002 HC RX W HCPCS: Performed by: PHYSICIAN ASSISTANT

## 2023-01-04 PROCEDURE — 80053 COMPREHEN METABOLIC PANEL: CPT

## 2023-01-04 PROCEDURE — 96374 THER/PROPH/DIAG INJ IV PUSH: CPT

## 2023-01-04 PROCEDURE — 99284 EMERGENCY DEPT VISIT MOD MDM: CPT

## 2023-01-04 PROCEDURE — 85025 COMPLETE CBC W/AUTO DIFF WBC: CPT

## 2023-01-04 PROCEDURE — 2500000003 HC RX 250 WO HCPCS: Performed by: PHYSICIAN ASSISTANT

## 2023-01-04 PROCEDURE — 96375 TX/PRO/DX INJ NEW DRUG ADDON: CPT

## 2023-01-04 RX ORDER — LABETALOL HYDROCHLORIDE 5 MG/ML
10 INJECTION, SOLUTION INTRAVENOUS ONCE
Status: COMPLETED | OUTPATIENT
Start: 2023-01-04 | End: 2023-01-04

## 2023-01-04 RX ORDER — CARVEDILOL 12.5 MG/1
25 TABLET ORAL 2 TIMES DAILY WITH MEALS
Qty: 60 TABLET | Refills: 5 | Status: SHIPPED | OUTPATIENT
Start: 2023-01-04

## 2023-01-04 RX ORDER — LABETALOL HYDROCHLORIDE 5 MG/ML
20 INJECTION, SOLUTION INTRAVENOUS ONCE
Status: COMPLETED | OUTPATIENT
Start: 2023-01-04 | End: 2023-01-04

## 2023-01-04 RX ORDER — HYDRALAZINE HYDROCHLORIDE 20 MG/ML
20 INJECTION INTRAMUSCULAR; INTRAVENOUS ONCE
Status: COMPLETED | OUTPATIENT
Start: 2023-01-04 | End: 2023-01-04

## 2023-01-04 RX ORDER — PREDNISONE 10 MG/1
60 TABLET ORAL DAILY
Qty: 30 TABLET | Refills: 0 | Status: SHIPPED | OUTPATIENT
Start: 2023-01-04 | End: 2023-01-09

## 2023-01-04 RX ORDER — METHYLPREDNISOLONE SODIUM SUCCINATE 125 MG/2ML
125 INJECTION, POWDER, LYOPHILIZED, FOR SOLUTION INTRAMUSCULAR; INTRAVENOUS ONCE
Status: COMPLETED | OUTPATIENT
Start: 2023-01-04 | End: 2023-01-04

## 2023-01-04 RX ADMIN — METHYLPREDNISOLONE SODIUM SUCCINATE 125 MG: 125 INJECTION, POWDER, FOR SOLUTION INTRAMUSCULAR; INTRAVENOUS at 16:33

## 2023-01-04 RX ADMIN — HYDRALAZINE HYDROCHLORIDE 20 MG: 20 INJECTION INTRAMUSCULAR; INTRAVENOUS at 16:32

## 2023-01-04 RX ADMIN — LABETALOL HYDROCHLORIDE 10 MG: 5 INJECTION, SOLUTION INTRAVENOUS at 17:47

## 2023-01-04 RX ADMIN — LABETALOL HYDROCHLORIDE 20 MG: 5 INJECTION, SOLUTION INTRAVENOUS at 18:53

## 2023-01-04 ASSESSMENT — ENCOUNTER SYMPTOMS
APNEA: 0
EYE PAIN: 0
SHORTNESS OF BREATH: 0
ABDOMINAL PAIN: 0
COLOR CHANGE: 0
TROUBLE SWALLOWING: 0
ALLERGIC/IMMUNOLOGIC NEGATIVE: 1

## 2023-01-04 ASSESSMENT — PAIN DESCRIPTION - FREQUENCY: FREQUENCY: INTERMITTENT

## 2023-01-04 ASSESSMENT — PAIN DESCRIPTION - DESCRIPTORS: DESCRIPTORS: BURNING;DISCOMFORT

## 2023-01-04 ASSESSMENT — PAIN DESCRIPTION - PAIN TYPE: TYPE: ACUTE PAIN;CHRONIC PAIN

## 2023-01-04 ASSESSMENT — PAIN SCALES - GENERAL: PAINLEVEL_OUTOF10: 9

## 2023-01-04 ASSESSMENT — PAIN DESCRIPTION - LOCATION: LOCATION: ARM;LEG

## 2023-01-04 ASSESSMENT — PAIN - FUNCTIONAL ASSESSMENT: PAIN_FUNCTIONAL_ASSESSMENT: 0-10

## 2023-01-04 NOTE — ED PROVIDER NOTES
[address] [facility] ED  EMERGENCY DEPARTMENT ENCOUNTER      Pt Name: Eloina Moncada  MRN: [id number]  [place] 1970  Date of evaluation: 1/4/2023  Provider: Porter Taveras PA-C    CHIEF COMPLAINT       Chief Complaint   Patient presents with    Rash     Rash x a few days pt states he always gets this rash this time of year. Pt states he took his blood pressure medication today. HISTORY OF PRESENT ILLNESS   (Location/Symptom, Timing/Onset, Context/Setting, Quality, Duration, Modifying Factors, Severity)  Note limiting factors. Eloina Moncada is a 50 y.o. male who presents to the emergency department with eczematous rash to the flexor surfaces of the arms and legs, ongoing for the past 2 days. Patient states that this happens every time of season and is itchy. Patient has been taking Benadryl without improvement. In addition, patient was noted to have elevated blood pressure today. Patient was seen and hospitalized 2 days ago in the hospital for EKG changes but had a catheterization that showed no need for intervention. Patient states that they just restarted him on his blood pressure medicine which she did take this morning. He denies any headaches or dizziness. Patient denies any current chest pain or shortness of breath. HPI    Nursing Notes were reviewed. REVIEW OF SYSTEMS    (2-9 systems for level 4, 10 or more for level 5)     Review of Systems   Constitutional:  Negative for diaphoresis and fever. HENT:  Negative for hearing loss and trouble swallowing. Eyes:  Negative for pain. Respiratory:  Negative for apnea and shortness of breath. Cardiovascular:  Negative for chest pain. Gastrointestinal:  Negative for abdominal pain. Endocrine: Negative. Genitourinary:  Negative for hematuria. Musculoskeletal:  Negative for neck pain and neck stiffness. Skin:  Positive for rash. Negative for color change. Allergic/Immunologic: Negative.     Neurological:  Negative for dizziness and numbness. Hematological: Negative. Psychiatric/Behavioral: Negative. All other systems reviewed and are negative. Except as noted above the remainder of the review of systems was reviewed and negative. PAST MEDICAL HISTORY     Past Medical History:   Diagnosis Date    ADHD (attention deficit hyperactivity disorder)     Anticoagulant long-term use     brilenta and aspirin    Asthma     CAD (coronary artery disease)     Hyperlipidemia     Hypertension     MI (myocardial infarction) ([facility] [address]     Substance abuse ([facility] [address]     history of smoking marijuana 4/18/2022 last time. SURGICAL HISTORY       Past Surgical History:   Procedure Laterality Date    CORONARY ANGIOPLASTY WITH STENT PLACEMENT      LIP SURGERY      1985    MOUTH SURGERY           CURRENT MEDICATIONS       Previous Medications    ASPIRIN 81 MG CHEWABLE TABLET    Take 1 tablet by mouth daily    ATORVASTATIN (LIPITOR) 80 MG TABLET    Take 1 tablet by mouth nightly    NITROGLYCERIN (NITROSTAT) 0.4 MG SL TABLET    PLACE 1 TABLET UNDER THE TONGUE EVERY 5 MINUTES AS NEEDED FOR CHEST PAIN (X MAXIMUM OF 3 DOSES. IF CP UNRELIEVED AFTER 3 DOSES, CALL 911)    PANTOPRAZOLE (PROTONIX) 40 MG TABLET    Take 1 tablet by mouth every morning (before breakfast)    SACUBITRIL-VALSARTAN (ENTRESTO) 24-26 MG PER TABLET    Take 1 tablet by mouth 2 times daily    SPIRONOLACTONE (ALDACTONE) 25 MG TABLET    Take 1 tablet by mouth daily    TICAGRELOR (BRILINTA) 90 MG TABS TABLET    Take 1 tablet by mouth 2 times daily       ALLERGIES     Patient has no known allergies.     FAMILY HISTORY       Family History   Problem Relation Age of Onset    Diabetes Mother     Diabetes Sister     High Blood Pressure Sister           SOCIAL HISTORY       Social History     Socioeconomic History    Marital status: Single   Tobacco Use    Smoking status: Former     Packs/day: 0.50     Years: 30.00     Pack years: 15.00     Types: Cigarettes     Quit date: 4/19/2022 Years since quittin.7    Smokeless tobacco: Never   Vaping Use    Vaping Use: Never used   Substance and Sexual Activity    Alcohol use: Yes     Comment: beer occassionally, last 2022    Drug use: Not Currently     Types: Marijuana Estil Catena)     Comment: last use 2022    Sexual activity: Not Currently     Partners: Female     Social Determinants of Health     Financial Resource Strain: Low Risk     Difficulty of Paying Living Expenses: Not hard at all   Food Insecurity: No Food Insecurity    Worried About [address] [employer] in the Last Year: Never true    [facility] in the Last Year: Never true       SCREENINGS        [place] Coma Scale  Eye Opening: Spontaneous  Best Verbal Response: Oriented  Best Motor Response: Obeys commands  [place]co Coma Scale Score: 15               PHYSICAL EXAM    (up to 7 for level 4, 8 or more for level 5)     ED Triage Vitals [23 1510]   BP Temp Temp Source Heart Rate Resp SpO2 Height Weight   (!) 194/111 97.7 °F (36.5 °C) Oral 62 17 98 % 5' 9\" (1.753 m) 151 lb (68.5 kg)       Physical Exam  Vitals and nursing note reviewed. Constitutional:       General: He is not in acute distress. Appearance: He is well-developed. He is not diaphoretic. HENT:      Head: Normocephalic and atraumatic. Mouth/Throat:      Mouth: Mucous membranes are moist.      Pharynx: No oropharyngeal exudate. Eyes:      General: No scleral icterus. Conjunctiva/sclera: Conjunctivae normal.      Pupils: Pupils are equal, round, and reactive to light. Neck:      Trachea: No tracheal deviation. Cardiovascular:      Rate and Rhythm: Normal rate. Heart sounds: Normal heart sounds. Pulmonary:      Effort: Pulmonary effort is normal. No respiratory distress. Breath sounds: Normal breath sounds. Abdominal:      General: Bowel sounds are normal. There is no distension. Palpations: Abdomen is soft. Musculoskeletal:         General: Normal range of motion. Cervical back: Normal range of motion and neck supple. No rigidity or tenderness. Skin:     General: Skin is warm and dry. Findings: Rash present. No erythema. Neurological:      Mental Status: He is alert and oriented to person, place, and time. Cranial Nerves: No cranial nerve deficit. Motor: No abnormal muscle tone. Psychiatric:         Behavior: Behavior normal.         Thought Content: Thought content normal.         Judgment: Judgment normal.       DIAGNOSTIC RESULTS     EKG: All EKG's are interpreted by the Emergency Department Physician who either signs or Co-signs this chart in the absence of a cardiologist.        RADIOLOGY:   Non-plain film images such as CT, Ultrasound and MRI are read by the radiologist. Plain radiographic images are visualized and preliminarily interpreted by the emergency physician with the below findings:      Interpretation per the Radiologist below, if available at the time of this note:    No orders to display         ED BEDSIDE ULTRASOUND:   Performed by ED Physician - none    LABS:  Labs Reviewed   COMPREHENSIVE METABOLIC PANEL - Abnormal; Notable for the following components:       Result Value    Anion Gap 8 (*)     Total Bilirubin 1.1 (*)     Alkaline Phosphatase 122 (*)     All other components within normal limits   CBC WITH AUTO DIFFERENTIAL - Abnormal; Notable for the following components:    RBC 4.22 (*)     Hemoglobin 13.1 (*)     Hematocrit 40.0 (*)     MCV 94.9 (*)     MCHC 32.6 (*)     RDW 14.8 (*)     All other components within normal limits       All other labs were within normal range or not returned as of this dictation.     EMERGENCY DEPARTMENT COURSE and DIFFERENTIAL DIAGNOSIS/MDM:   Vitals:    Vitals:    01/04/23 1700 01/04/23 1730 01/04/23 1800 01/04/23 1830   BP: (!) 157/98 (!) 164/104 (!) 157/111 (!) 148/99   Pulse: 71 75 74 74   Resp: 21 15 21 12   Temp:       TempSrc:       SpO2: 98% 98% 98% 96%   Weight:       Height: MDM        REASSESSMENT        Patient presented to the emergency department for evaluation of chronic eczema exacerbation for the past several days. Patient was noted to have hypertension on exam.  Patient differential diagnosis of rash included eczema versus contact dermatitis but given the flexural areas and chronic nature, it seems that this is eczema. This will be treated with prednisone and Aquaphor ointment. Patient's hypertension included a differential diagnosis of urgency versus emergency. Patient however had no chest pain, palpitations, shortness of breath, headaches, dizziness or lightheadedness. Patient's laboratory testing did not reveal any endorgan dysfunction and patient had a catheterization 2 days ago that was normal.  Patient will have medications adjusted to increase carvedilol dosing as well as above-mentioned prescriptions for his eczema. Patient was advised to call his primary doctor in the morning to discuss further evaluation and care of his blood pressure. Admission was considered however given the asymptomatic nature of his hypertension and improvement with IV meds. He will be discharged home    CONSULTS:  None    PROCEDURES:  Unless otherwise noted below, none     Procedures        FINAL IMPRESSION      1. Flexural eczema    2. Hypertensive urgency          DISPOSITION/PLAN   DISPOSITION Decision To Discharge 01/04/2023 07:14:13 PM      PATIENT REFERRED TO:  Nicole Cruz MD  [id number] [place] [phone]    Call in 1 day      DISCHARGE MEDICATIONS:  New Prescriptions    MINERAL OIL-HYDROPHILIC PETROLATUM (AQUAPHOR) OINTMENT    Apply topically as needed. PREDNISONE (DELTASONE) 10 MG TABLET    Take 6 tablets by mouth daily for 5 doses     Controlled Substances Monitoring:     No flowsheet data found.     (Please note that portions of this note were completed with a voice recognition program.  Efforts were made to edit the dictations but occasionally words are mis-transcribed.)    Maulik Newton PA-C (electronically signed)  Attending Emergency Physician            Maulik Newton PA-C  01/04/23 1918

## 2023-01-05 NOTE — ED NOTES
Discharge instructions reviewed with pt. Pt verbalized understanding with no questions or concerns. Resps even, non labored. Skin p/w/d. IV removed.      Anni Block RN  01/04/23 1956